# Patient Record
Sex: FEMALE | Race: WHITE | NOT HISPANIC OR LATINO | Employment: FULL TIME | URBAN - METROPOLITAN AREA
[De-identification: names, ages, dates, MRNs, and addresses within clinical notes are randomized per-mention and may not be internally consistent; named-entity substitution may affect disease eponyms.]

---

## 2017-01-26 ENCOUNTER — OFFICE VISIT - HEALTHEAST (OUTPATIENT)
Dept: FAMILY MEDICINE | Facility: CLINIC | Age: 32
End: 2017-01-26

## 2017-01-26 DIAGNOSIS — J06.9 ACUTE URI: ICD-10-CM

## 2017-01-26 DIAGNOSIS — N92.6 MISSED PERIOD: ICD-10-CM

## 2017-01-26 ASSESSMENT — MIFFLIN-ST. JEOR: SCORE: 1389.42

## 2017-02-03 ENCOUNTER — COMMUNICATION - HEALTHEAST (OUTPATIENT)
Dept: FAMILY MEDICINE | Facility: CLINIC | Age: 32
End: 2017-02-03

## 2017-02-03 DIAGNOSIS — J01.90 ACUTE SINUSITIS: ICD-10-CM

## 2017-02-13 ENCOUNTER — OFFICE VISIT - HEALTHEAST (OUTPATIENT)
Dept: FAMILY MEDICINE | Facility: CLINIC | Age: 32
End: 2017-02-13

## 2017-02-13 DIAGNOSIS — Z36.87 UNSURE OF LMP (LAST MENSTRUAL PERIOD) AS REASON FOR ULTRASOUND SCAN: ICD-10-CM

## 2017-02-13 DIAGNOSIS — N91.2 AMENORRHEA: ICD-10-CM

## 2017-02-13 DIAGNOSIS — F34.1 DYSTHYMIC DISORDER: ICD-10-CM

## 2017-02-13 DIAGNOSIS — F98.8 ATTENTION DEFICIT DISORDER: ICD-10-CM

## 2017-02-13 DIAGNOSIS — Z3A.01 LESS THAN 8 WEEKS GESTATION OF PREGNANCY: ICD-10-CM

## 2017-02-13 DIAGNOSIS — K21.9 GASTROESOPHAGEAL REFLUX DISEASE WITHOUT ESOPHAGITIS: ICD-10-CM

## 2017-02-13 ASSESSMENT — MIFFLIN-ST. JEOR: SCORE: 1409.83

## 2017-02-15 ENCOUNTER — COMMUNICATION - HEALTHEAST (OUTPATIENT)
Dept: FAMILY MEDICINE | Facility: CLINIC | Age: 32
End: 2017-02-15

## 2017-02-19 ENCOUNTER — COMMUNICATION - HEALTHEAST (OUTPATIENT)
Dept: FAMILY MEDICINE | Facility: CLINIC | Age: 32
End: 2017-02-19

## 2017-02-22 ENCOUNTER — HOSPITAL ENCOUNTER (OUTPATIENT)
Dept: ULTRASOUND IMAGING | Facility: HOSPITAL | Age: 32
Discharge: HOME | End: 2017-02-22
Attending: FAMILY MEDICINE

## 2017-02-22 ENCOUNTER — OFFICE VISIT - HEALTHEAST (OUTPATIENT)
Dept: FAMILY MEDICINE | Facility: CLINIC | Age: 32
End: 2017-02-22

## 2017-02-22 ENCOUNTER — SURGERY - HEALTHEAST (OUTPATIENT)
Dept: SURGERY | Facility: HOSPITAL | Age: 32
End: 2017-02-22

## 2017-02-22 ENCOUNTER — COMMUNICATION - HEALTHEAST (OUTPATIENT)
Dept: FAMILY MEDICINE | Facility: CLINIC | Age: 32
End: 2017-02-22

## 2017-02-22 ENCOUNTER — COMMUNICATION - HEALTHEAST (OUTPATIENT)
Dept: SCHEDULING | Facility: CLINIC | Age: 32
End: 2017-02-22

## 2017-02-22 ENCOUNTER — ANESTHESIA - HEALTHEAST (OUTPATIENT)
Dept: SURGERY | Facility: HOSPITAL | Age: 32
End: 2017-02-22

## 2017-02-22 DIAGNOSIS — O26.899 ABDOMINAL PAIN AFFECTING PREGNANCY: ICD-10-CM

## 2017-02-22 DIAGNOSIS — Z34.90 PREGNANCY: ICD-10-CM

## 2017-02-22 DIAGNOSIS — R10.9 ABDOMINAL PAIN AFFECTING PREGNANCY: ICD-10-CM

## 2017-02-22 DIAGNOSIS — O00.00 ABDOMINAL PREGNANCY WITHOUT INTRAUTERINE PREGNANCY: ICD-10-CM

## 2017-02-22 DIAGNOSIS — Z33.1 PREGNANT STATE, INCIDENTAL: ICD-10-CM

## 2017-02-22 ASSESSMENT — MIFFLIN-ST. JEOR: SCORE: 1416.64

## 2017-06-29 ENCOUNTER — OFFICE VISIT - HEALTHEAST (OUTPATIENT)
Dept: FAMILY MEDICINE | Facility: CLINIC | Age: 32
End: 2017-06-29

## 2017-06-29 DIAGNOSIS — M79.601 PARESTHESIA AND PAIN OF BOTH UPPER EXTREMITIES: ICD-10-CM

## 2017-06-29 DIAGNOSIS — R20.2 PARESTHESIA AND PAIN OF BOTH UPPER EXTREMITIES: ICD-10-CM

## 2017-06-29 DIAGNOSIS — M79.602 PARESTHESIA AND PAIN OF BOTH UPPER EXTREMITIES: ICD-10-CM

## 2017-06-29 LAB — HBA1C MFR BLD: 5.5 % (ref 3.5–6)

## 2017-06-29 ASSESSMENT — MIFFLIN-ST. JEOR: SCORE: 1471.07

## 2017-06-30 LAB — SYPHILIS RPR SCREEN - HISTORICAL: NORMAL

## 2017-07-13 ENCOUNTER — HOSPITAL ENCOUNTER (OUTPATIENT)
Dept: PHYSICAL MEDICINE AND REHAB | Facility: CLINIC | Age: 32
Discharge: HOME | End: 2017-07-13
Attending: FAMILY MEDICINE

## 2017-07-13 DIAGNOSIS — R20.2 NUMBNESS AND TINGLING IN BOTH HANDS: ICD-10-CM

## 2017-07-13 DIAGNOSIS — R20.0 NUMBNESS AND TINGLING IN BOTH HANDS: ICD-10-CM

## 2017-08-28 ENCOUNTER — COMMUNICATION - HEALTHEAST (OUTPATIENT)
Dept: FAMILY MEDICINE | Facility: CLINIC | Age: 32
End: 2017-08-28

## 2017-08-29 ENCOUNTER — COMMUNICATION - HEALTHEAST (OUTPATIENT)
Dept: FAMILY MEDICINE | Facility: CLINIC | Age: 32
End: 2017-08-29

## 2017-08-29 ENCOUNTER — RECORDS - HEALTHEAST (OUTPATIENT)
Dept: ADMINISTRATIVE | Facility: OTHER | Age: 32
End: 2017-08-29

## 2017-09-27 ENCOUNTER — OFFICE VISIT - HEALTHEAST (OUTPATIENT)
Dept: FAMILY MEDICINE | Facility: CLINIC | Age: 32
End: 2017-09-27

## 2017-09-27 DIAGNOSIS — M79.601 PARESTHESIA AND PAIN OF BOTH UPPER EXTREMITIES: ICD-10-CM

## 2017-09-27 DIAGNOSIS — M54.2 NECK PAIN: ICD-10-CM

## 2017-09-27 DIAGNOSIS — M79.602 PARESTHESIA AND PAIN OF BOTH UPPER EXTREMITIES: ICD-10-CM

## 2017-09-27 DIAGNOSIS — R20.2 PARESTHESIA AND PAIN OF BOTH UPPER EXTREMITIES: ICD-10-CM

## 2017-09-27 ASSESSMENT — MIFFLIN-ST. JEOR: SCORE: 1448.39

## 2017-10-13 ENCOUNTER — COMMUNICATION - HEALTHEAST (OUTPATIENT)
Dept: FAMILY MEDICINE | Facility: CLINIC | Age: 32
End: 2017-10-13

## 2017-10-24 ENCOUNTER — COMMUNICATION - HEALTHEAST (OUTPATIENT)
Dept: FAMILY MEDICINE | Facility: CLINIC | Age: 32
End: 2017-10-24

## 2017-12-18 ENCOUNTER — COMMUNICATION - HEALTHEAST (OUTPATIENT)
Dept: FAMILY MEDICINE | Facility: CLINIC | Age: 32
End: 2017-12-18

## 2018-01-16 ENCOUNTER — COMMUNICATION - HEALTHEAST (OUTPATIENT)
Dept: FAMILY MEDICINE | Facility: CLINIC | Age: 33
End: 2018-01-16

## 2018-01-18 ENCOUNTER — RECORDS - HEALTHEAST (OUTPATIENT)
Dept: ADMINISTRATIVE | Facility: OTHER | Age: 33
End: 2018-01-18

## 2018-01-18 LAB
HPV_EXT - HISTORICAL: NORMAL
PAP SMEAR - HIM PATIENT REPORTED: ABNORMAL

## 2018-01-29 ENCOUNTER — COMMUNICATION - HEALTHEAST (OUTPATIENT)
Dept: FAMILY MEDICINE | Facility: CLINIC | Age: 33
End: 2018-01-29

## 2018-02-02 ENCOUNTER — RECORDS - HEALTHEAST (OUTPATIENT)
Dept: ADMINISTRATIVE | Facility: OTHER | Age: 33
End: 2018-02-02

## 2018-09-25 ENCOUNTER — AMBULATORY - HEALTHEAST (OUTPATIENT)
Dept: NURSING | Facility: CLINIC | Age: 33
End: 2018-09-25

## 2018-09-25 DIAGNOSIS — Z23 NEEDS FLU SHOT: ICD-10-CM

## 2018-10-12 ENCOUNTER — COMMUNICATION - HEALTHEAST (OUTPATIENT)
Dept: CARE COORDINATION | Facility: CLINIC | Age: 33
End: 2018-10-12

## 2018-10-15 ASSESSMENT — MIFFLIN-ST. JEOR: SCORE: 1548.18

## 2018-10-16 ENCOUNTER — ANESTHESIA - HEALTHEAST (OUTPATIENT)
Dept: OBGYN | Facility: HOSPITAL | Age: 33
End: 2018-10-16

## 2018-10-16 ENCOUNTER — SURGERY - HEALTHEAST (OUTPATIENT)
Dept: OBGYN | Facility: HOSPITAL | Age: 33
End: 2018-10-16

## 2018-10-16 ASSESSMENT — MIFFLIN-ST. JEOR: SCORE: 1548.18

## 2018-10-17 ENCOUNTER — HOME CARE/HOSPICE - HEALTHEAST (OUTPATIENT)
Dept: HOME HEALTH SERVICES | Facility: HOME HEALTH | Age: 33
End: 2018-10-17

## 2018-10-19 ENCOUNTER — HOME CARE/HOSPICE - HEALTHEAST (OUTPATIENT)
Dept: HOME HEALTH SERVICES | Facility: HOME HEALTH | Age: 33
End: 2018-10-19

## 2018-10-19 ENCOUNTER — COMMUNICATION - HEALTHEAST (OUTPATIENT)
Dept: CARE COORDINATION | Facility: CLINIC | Age: 33
End: 2018-10-19

## 2018-10-24 ENCOUNTER — COMMUNICATION - HEALTHEAST (OUTPATIENT)
Dept: OBGYN | Facility: CLINIC | Age: 33
End: 2018-10-24

## 2019-01-31 ENCOUNTER — COMMUNICATION - HEALTHEAST (OUTPATIENT)
Dept: FAMILY MEDICINE | Facility: CLINIC | Age: 34
End: 2019-01-31

## 2019-03-14 ENCOUNTER — OFFICE VISIT - HEALTHEAST (OUTPATIENT)
Dept: FAMILY MEDICINE | Facility: CLINIC | Age: 34
End: 2019-03-14

## 2019-03-14 DIAGNOSIS — B34.9 VIRAL ILLNESS: ICD-10-CM

## 2019-03-14 DIAGNOSIS — J02.9 SORE THROAT: ICD-10-CM

## 2019-03-14 LAB
DEPRECATED S PYO AG THROAT QL EIA: NORMAL
FLUAV AG SPEC QL IA: NORMAL
FLUBV AG SPEC QL IA: NORMAL

## 2019-03-14 ASSESSMENT — MIFFLIN-ST. JEOR: SCORE: 1430.24

## 2019-03-15 LAB — GROUP A STREP BY PCR: NORMAL

## 2019-04-22 ENCOUNTER — OFFICE VISIT - HEALTHEAST (OUTPATIENT)
Dept: FAMILY MEDICINE | Facility: CLINIC | Age: 34
End: 2019-04-22

## 2019-04-22 DIAGNOSIS — F41.9 ANXIETY: ICD-10-CM

## 2019-04-22 DIAGNOSIS — F33.0 MILD EPISODE OF RECURRENT MAJOR DEPRESSIVE DISORDER (H): ICD-10-CM

## 2019-04-22 DIAGNOSIS — F90.0 ATTENTION DEFICIT HYPERACTIVITY DISORDER (ADHD), PREDOMINANTLY INATTENTIVE TYPE: ICD-10-CM

## 2019-04-22 ASSESSMENT — MIFFLIN-ST. JEOR: SCORE: 1439.32

## 2019-04-26 ENCOUNTER — COMMUNICATION - HEALTHEAST (OUTPATIENT)
Dept: FAMILY MEDICINE | Facility: CLINIC | Age: 34
End: 2019-04-26

## 2019-05-28 ENCOUNTER — OFFICE VISIT - HEALTHEAST (OUTPATIENT)
Dept: FAMILY MEDICINE | Facility: CLINIC | Age: 34
End: 2019-05-28

## 2019-05-28 DIAGNOSIS — R19.7 DIARRHEA OF PRESUMED INFECTIOUS ORIGIN: ICD-10-CM

## 2019-05-28 LAB
C DIFF TOX B STL QL: NEGATIVE
RIBOTYPE 027/NAP1/BI: NORMAL

## 2019-05-28 ASSESSMENT — MIFFLIN-ST. JEOR: SCORE: 1427.98

## 2019-05-29 LAB
C PARVUM AG STL QL IA: NORMAL
G LAMBLIA AG STL QL IA: NORMAL
O+P STL MICRO: NORMAL
SHIGA TOXIN 1: NEGATIVE
SHIGA TOXIN 2: NEGATIVE

## 2019-05-31 LAB — BACTERIA SPEC CULT: NORMAL

## 2019-06-19 ENCOUNTER — COMMUNICATION - HEALTHEAST (OUTPATIENT)
Dept: FAMILY MEDICINE | Facility: CLINIC | Age: 34
End: 2019-06-19

## 2019-06-19 DIAGNOSIS — F90.0 ATTENTION DEFICIT HYPERACTIVITY DISORDER (ADHD), PREDOMINANTLY INATTENTIVE TYPE: ICD-10-CM

## 2019-07-09 ENCOUNTER — OFFICE VISIT - HEALTHEAST (OUTPATIENT)
Dept: FAMILY MEDICINE | Facility: CLINIC | Age: 34
End: 2019-07-09

## 2019-07-09 ENCOUNTER — COMMUNICATION - HEALTHEAST (OUTPATIENT)
Dept: FAMILY MEDICINE | Facility: CLINIC | Age: 34
End: 2019-07-09

## 2019-07-09 DIAGNOSIS — N76.0 ACUTE VAGINITIS: ICD-10-CM

## 2019-07-09 LAB
CLUE CELLS: ABNORMAL
TRICHOMONAS, WET PREP: ABNORMAL
YEAST, WET PREP: ABNORMAL

## 2019-07-09 ASSESSMENT — MIFFLIN-ST. JEOR: SCORE: 1439.32

## 2019-07-16 ENCOUNTER — RECORDS - HEALTHEAST (OUTPATIENT)
Dept: HEALTH INFORMATION MANAGEMENT | Facility: CLINIC | Age: 34
End: 2019-07-16

## 2019-07-17 ENCOUNTER — COMMUNICATION - HEALTHEAST (OUTPATIENT)
Dept: FAMILY MEDICINE | Facility: CLINIC | Age: 34
End: 2019-07-17

## 2019-07-17 DIAGNOSIS — L71.0 PERIORAL DERMATITIS: ICD-10-CM

## 2019-07-17 DIAGNOSIS — N76.0 ACUTE VAGINITIS: ICD-10-CM

## 2019-07-17 DIAGNOSIS — F90.0 ATTENTION DEFICIT HYPERACTIVITY DISORDER (ADHD), PREDOMINANTLY INATTENTIVE TYPE: ICD-10-CM

## 2019-08-15 ENCOUNTER — COMMUNICATION - HEALTHEAST (OUTPATIENT)
Dept: FAMILY MEDICINE | Facility: CLINIC | Age: 34
End: 2019-08-15

## 2019-08-15 DIAGNOSIS — F90.0 ATTENTION DEFICIT HYPERACTIVITY DISORDER (ADHD), PREDOMINANTLY INATTENTIVE TYPE: ICD-10-CM

## 2019-09-25 ENCOUNTER — COMMUNICATION - HEALTHEAST (OUTPATIENT)
Dept: FAMILY MEDICINE | Facility: CLINIC | Age: 34
End: 2019-09-25

## 2019-09-25 DIAGNOSIS — F90.0 ATTENTION DEFICIT HYPERACTIVITY DISORDER (ADHD), PREDOMINANTLY INATTENTIVE TYPE: ICD-10-CM

## 2019-10-28 ENCOUNTER — COMMUNICATION - HEALTHEAST (OUTPATIENT)
Dept: FAMILY MEDICINE | Facility: CLINIC | Age: 34
End: 2019-10-28

## 2019-10-28 DIAGNOSIS — F90.0 ATTENTION DEFICIT HYPERACTIVITY DISORDER (ADHD), PREDOMINANTLY INATTENTIVE TYPE: ICD-10-CM

## 2019-12-03 ENCOUNTER — COMMUNICATION - HEALTHEAST (OUTPATIENT)
Dept: FAMILY MEDICINE | Facility: CLINIC | Age: 34
End: 2019-12-03

## 2019-12-03 DIAGNOSIS — F90.0 ATTENTION DEFICIT HYPERACTIVITY DISORDER (ADHD), PREDOMINANTLY INATTENTIVE TYPE: ICD-10-CM

## 2020-01-01 ENCOUNTER — COMMUNICATION - HEALTHEAST (OUTPATIENT)
Dept: FAMILY MEDICINE | Facility: CLINIC | Age: 35
End: 2020-01-01

## 2020-01-01 DIAGNOSIS — F33.0 MILD EPISODE OF RECURRENT MAJOR DEPRESSIVE DISORDER (H): ICD-10-CM

## 2020-01-01 DIAGNOSIS — F90.0 ATTENTION DEFICIT HYPERACTIVITY DISORDER (ADHD), PREDOMINANTLY INATTENTIVE TYPE: ICD-10-CM

## 2020-01-01 DIAGNOSIS — F41.9 ANXIETY: ICD-10-CM

## 2020-01-03 ENCOUNTER — COMMUNICATION - HEALTHEAST (OUTPATIENT)
Dept: FAMILY MEDICINE | Facility: CLINIC | Age: 35
End: 2020-01-03

## 2020-01-03 DIAGNOSIS — F33.0 MILD EPISODE OF RECURRENT MAJOR DEPRESSIVE DISORDER (H): ICD-10-CM

## 2020-02-04 ENCOUNTER — COMMUNICATION - HEALTHEAST (OUTPATIENT)
Dept: FAMILY MEDICINE | Facility: CLINIC | Age: 35
End: 2020-02-04

## 2020-02-04 DIAGNOSIS — F90.0 ATTENTION DEFICIT HYPERACTIVITY DISORDER (ADHD), PREDOMINANTLY INATTENTIVE TYPE: ICD-10-CM

## 2020-03-02 ENCOUNTER — COMMUNICATION - HEALTHEAST (OUTPATIENT)
Dept: FAMILY MEDICINE | Facility: CLINIC | Age: 35
End: 2020-03-02

## 2020-03-02 DIAGNOSIS — F90.0 ATTENTION DEFICIT HYPERACTIVITY DISORDER (ADHD), PREDOMINANTLY INATTENTIVE TYPE: ICD-10-CM

## 2020-03-26 ENCOUNTER — OFFICE VISIT - HEALTHEAST (OUTPATIENT)
Dept: FAMILY MEDICINE | Facility: CLINIC | Age: 35
End: 2020-03-26

## 2020-03-26 DIAGNOSIS — R05.9 COUGH: ICD-10-CM

## 2020-03-30 ENCOUNTER — COMMUNICATION - HEALTHEAST (OUTPATIENT)
Dept: FAMILY MEDICINE | Facility: CLINIC | Age: 35
End: 2020-03-30

## 2020-03-30 DIAGNOSIS — L71.0 PERIORAL DERMATITIS: ICD-10-CM

## 2020-04-02 ENCOUNTER — COMMUNICATION - HEALTHEAST (OUTPATIENT)
Dept: FAMILY MEDICINE | Facility: CLINIC | Age: 35
End: 2020-04-02

## 2020-04-02 DIAGNOSIS — F33.0 MILD EPISODE OF RECURRENT MAJOR DEPRESSIVE DISORDER (H): ICD-10-CM

## 2020-04-02 DIAGNOSIS — F90.0 ATTENTION DEFICIT HYPERACTIVITY DISORDER (ADHD), PREDOMINANTLY INATTENTIVE TYPE: ICD-10-CM

## 2020-04-14 ENCOUNTER — COMMUNICATION - HEALTHEAST (OUTPATIENT)
Dept: FAMILY MEDICINE | Facility: CLINIC | Age: 35
End: 2020-04-14

## 2020-04-15 ENCOUNTER — OFFICE VISIT - HEALTHEAST (OUTPATIENT)
Dept: FAMILY MEDICINE | Facility: CLINIC | Age: 35
End: 2020-04-15

## 2020-04-15 DIAGNOSIS — H92.02 OTALGIA, LEFT: ICD-10-CM

## 2020-04-15 ASSESSMENT — PATIENT HEALTH QUESTIONNAIRE - PHQ9: SUM OF ALL RESPONSES TO PHQ QUESTIONS 1-9: 0

## 2020-05-06 ENCOUNTER — COMMUNICATION - HEALTHEAST (OUTPATIENT)
Dept: FAMILY MEDICINE | Facility: CLINIC | Age: 35
End: 2020-05-06

## 2020-05-06 DIAGNOSIS — F90.0 ATTENTION DEFICIT HYPERACTIVITY DISORDER (ADHD), PREDOMINANTLY INATTENTIVE TYPE: ICD-10-CM

## 2020-06-02 ENCOUNTER — COMMUNICATION - HEALTHEAST (OUTPATIENT)
Dept: FAMILY MEDICINE | Facility: CLINIC | Age: 35
End: 2020-06-02

## 2020-06-02 DIAGNOSIS — F90.0 ATTENTION DEFICIT HYPERACTIVITY DISORDER (ADHD), PREDOMINANTLY INATTENTIVE TYPE: ICD-10-CM

## 2020-06-02 DIAGNOSIS — F33.0 MILD EPISODE OF RECURRENT MAJOR DEPRESSIVE DISORDER (H): ICD-10-CM

## 2020-07-03 ENCOUNTER — COMMUNICATION - HEALTHEAST (OUTPATIENT)
Dept: FAMILY MEDICINE | Facility: CLINIC | Age: 35
End: 2020-07-03

## 2020-07-03 DIAGNOSIS — F90.0 ATTENTION DEFICIT HYPERACTIVITY DISORDER (ADHD), PREDOMINANTLY INATTENTIVE TYPE: ICD-10-CM

## 2020-08-03 ENCOUNTER — COMMUNICATION - HEALTHEAST (OUTPATIENT)
Dept: FAMILY MEDICINE | Facility: CLINIC | Age: 35
End: 2020-08-03

## 2020-08-03 DIAGNOSIS — F90.0 ATTENTION DEFICIT HYPERACTIVITY DISORDER (ADHD), PREDOMINANTLY INATTENTIVE TYPE: ICD-10-CM

## 2020-09-03 ENCOUNTER — COMMUNICATION - HEALTHEAST (OUTPATIENT)
Dept: FAMILY MEDICINE | Facility: CLINIC | Age: 35
End: 2020-09-03

## 2020-09-03 DIAGNOSIS — F90.0 ATTENTION DEFICIT HYPERACTIVITY DISORDER (ADHD), PREDOMINANTLY INATTENTIVE TYPE: ICD-10-CM

## 2020-09-09 ENCOUNTER — OFFICE VISIT - HEALTHEAST (OUTPATIENT)
Dept: FAMILY MEDICINE | Facility: CLINIC | Age: 35
End: 2020-09-09

## 2020-09-09 ENCOUNTER — COMMUNICATION - HEALTHEAST (OUTPATIENT)
Dept: FAMILY MEDICINE | Facility: CLINIC | Age: 35
End: 2020-09-09

## 2020-09-09 DIAGNOSIS — L30.9 ECZEMA, UNSPECIFIED TYPE: ICD-10-CM

## 2020-09-09 DIAGNOSIS — H04.302 DACROCYSTITIS, LEFT: ICD-10-CM

## 2020-10-05 ENCOUNTER — COMMUNICATION - HEALTHEAST (OUTPATIENT)
Dept: FAMILY MEDICINE | Facility: CLINIC | Age: 35
End: 2020-10-05

## 2020-10-05 DIAGNOSIS — F90.0 ATTENTION DEFICIT HYPERACTIVITY DISORDER (ADHD), PREDOMINANTLY INATTENTIVE TYPE: ICD-10-CM

## 2020-10-22 ENCOUNTER — OFFICE VISIT - HEALTHEAST (OUTPATIENT)
Dept: FAMILY MEDICINE | Facility: CLINIC | Age: 35
End: 2020-10-22

## 2020-10-22 ENCOUNTER — COMMUNICATION - HEALTHEAST (OUTPATIENT)
Dept: FAMILY MEDICINE | Facility: CLINIC | Age: 35
End: 2020-10-22

## 2020-10-22 DIAGNOSIS — M25.511 ACUTE PAIN OF RIGHT SHOULDER: ICD-10-CM

## 2020-10-22 DIAGNOSIS — M77.11 RIGHT LATERAL EPICONDYLITIS: ICD-10-CM

## 2020-10-22 DIAGNOSIS — Z32.01 PREGNANCY CONFIRMED BY POSITIVE URINE TEST: ICD-10-CM

## 2020-10-22 LAB — HCG UR QL: POSITIVE

## 2020-10-22 RX ORDER — LORATADINE 10 MG/1
10 TABLET ORAL DAILY
Status: SHIPPED | COMMUNITY
Start: 2020-10-22 | End: 2022-04-20

## 2020-10-22 ASSESSMENT — MIFFLIN-ST. JEOR: SCORE: 1493.75

## 2020-10-27 ENCOUNTER — COMMUNICATION - HEALTHEAST (OUTPATIENT)
Dept: FAMILY MEDICINE | Facility: CLINIC | Age: 35
End: 2020-10-27

## 2020-10-27 DIAGNOSIS — F33.0 MILD EPISODE OF RECURRENT MAJOR DEPRESSIVE DISORDER (H): ICD-10-CM

## 2020-11-09 ENCOUNTER — COMMUNICATION - HEALTHEAST (OUTPATIENT)
Dept: FAMILY MEDICINE | Facility: CLINIC | Age: 35
End: 2020-11-09

## 2020-11-09 ENCOUNTER — VIRTUAL VISIT (OUTPATIENT)
Dept: FAMILY MEDICINE | Facility: OTHER | Age: 35
End: 2020-11-09
Payer: COMMERCIAL

## 2020-11-09 PROCEDURE — 99421 OL DIG E/M SVC 5-10 MIN: CPT | Performed by: FAMILY MEDICINE

## 2020-11-10 NOTE — PROGRESS NOTES
"Date: 2020 14:35:23  Clinician: Marcus Encarnacion  Clinician NPI: [id number]  Patient: Lorraine Harris  Patient : 1985  Patient Address: [address] [place], [place] [address] [id number]  Patient Phone: [phone]  Visit Protocol: URI  Patient Summary:  Lorraine is a 35 year old ( : 1985 ) female who initiated a [facility] Visit for COVID-19 (Coronavirus) evaluation and screening. When asked the question \"Please sign me up to receive news, health information and promotions from [facility].\", Lorraine responded \"No\".    Lorraine states her symptoms started suddenly 3-4 days ago.   Her symptoms consist of rhinitis, facial pain or pressure, myalgia, chills, malaise, a sore throat, diarrhea, a headache, a cough, nasal congestion, and nausea. She is experiencing mild difficulty breathing with activities but can speak normally in full sentences. Lorraine also feels feverish.   Symptom details     Nasal secretions: The color of her mucus is green.    Cough: Lorraine coughs a few times an hour and her cough is more bothersome at night. Phlegm does not come into her throat when she coughs. She does not believe her cough is caused by post-nasal drip.     Sore throat: Lorraine reports having mild throat pain (1-3 on a 10 point pain scale), does not have exudate on her tonsils, and can swallow liquids. She is not sure if the lymph nodes in her neck are enlarged. A rash has not appeared on the skin since the sore throat started.     Temperature: Her current temperature is 97.9 degrees Fahrenheit.     Facial pain or pressure: The facial pain or pressure feels worse when bending over or leaning forward.     Headache: She states the headache is moderate (4-6 on a 10 point pain scale).      Lorraine denies having vomiting, teeth pain, ageusia, ear pain, wheezing, and anosmia. She also denies taking antibiotic medication in the past month, having recent facial or sinus surgery in the past 60 days, and double sickening (worsening symptoms after initial " improvement).   Precipitating events  Within the past week, Lorraine has not been exposed to someone with strep throat. She has not recently been exposed to someone with influenza. Lorraine has been in close contact with the following high risk individuals: immunocompromised people, adults 65 or older, pregnant women, children under the age of 5, and people with asthma, heart disease or diabetes.   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 In the past 14 days, Lorraine has not worked or volunteered at a healthcare facility or group living setting.   In the past 14 days, she also has not lived in a congregate living setting.   Lorraine has not had a close contact with a laboratory-confirmed COVID-19 patient within 14 days of symptom onset.    Since December 2019, Lorraine has been tested for COVID-19 and has not had upper respiratory infection or influenza-like illness.      Result of COVID-19 test: Negative     Date of her COVID-19 test: 04/01/2020      Pertinent medical history  Lorraine had 1 sinus infection within the past year.   Lorraine does not get yeast infections when she takes antibiotics.   Lorraine needs a return to work/school note.   Weight: 180 lbs   Lorraine does not smoke or use smokeless tobacco.   She is pregnant and denies breastfeeding.   Additional information as reported by the patient (free text): About 6 weeks pregnant. First felt like sinus infection but last night started feeling awful-full body aches, coughing, headache, chills, waking up in sweats or freezing, nasal congestion, green mucus, eyes/nose burning.   Weight: 180 lbs    MEDICATIONS: Tums oral, Flonase Allergy Relief nasal, Prenatal oral, Tylenol oral, venlafaxine oral, ALLERGIES: NKDA  Clinician Response:  Dear Lorraine,   Your symptoms show that you may have coronavirus (COVID-19). This illness can cause fever, cough and trouble breathing. Many  "people get a mild case and get better on their own. Some people can get very sick.  What should I do?  We would like to test you for this virus.   1. Please call [phone] to schedule your visit. Explain that you were referred by [facility] to have a COVID-19 test. Be ready to share your [facility] visit ID number.  * If you need to schedule in [facility] please call [phone] or for [employer] [place] employees please call [phone].  * If you need to schedule in the [place] area please call [phone]. [employer] employees call [phone].  The following will serve as your written order for this COVID Test, ordered by me, for the indication of suspected COVID [Z20.828]: The test will be ordered in [employer], our electronic health record, after you are scheduled. It will show as ordered and authorized by Arash Rhodes MD.  Order: COVID-19 (Coronavirus) PCR for SYMPTOMATIC testing from [facility].   2. When it's time for your COVID test:  Stay at least 6 feet away from others. (If someone will drive you to your test, stay in the backseat, as far away from the  as you can.)   Cover your mouth and nose with a mask, tissue or washcloth.  Go straight to the testing site. Don't make any stops on the way there or back.      3.Starting now: Stay home and away from others (self-isolate) until:   You've had no fever---and no medicine that reduces fever---for one full day (24 hours). And...   Your other symptoms have gotten better. For example, your cough or breathing has improved. And...   At least 10 days have passed since your symptoms started.       During this time, don't leave the house except for testing or medical care.   Stay in your own room, even for meals. Use your own bathroom if you can.   Stay away from others in your home. No hugging, kissing or shaking hands. No visitors.  Don't go to work, school or anywhere else.    Clean \"high touch\" surfaces often (doorknobs, counters, handles, etc.). Use a household cleaning spray " or wipes. You'll find a full list of  on the EPA website: www.epa.gov/pesticide-registration/list-n-disinfectants-use-against-sars-cov-2.   Cover your mouth and nose with a mask, tissue or washcloth to avoid spreading germs.  Wash your hands and face often. Use soap and water.  Caregivers in these groups are at risk for severe illness due to COVID-19:  o People 65 years and older  o People who live in a nursing home or long-term care facility  o People with chronic disease (lung, heart, cancer, diabetes, kidney, liver, immunologic)  o People who have a weakened immune system, including those who:   Are in cancer treatment  Take medicine that weakens the immune system, such as corticosteroids  Had a bone marrow or organ transplant  Have an immune deficiency  Have poorly controlled HIV or AIDS  Are obese (body mass index of 40 or higher)  Smoke regularly   o Caregivers should wear gloves while washing dishes, handling laundry and cleaning bedrooms and bathrooms.  o Use caution when washing and drying laundry: Don't shake dirty laundry, and use the warmest water setting that you can.  o For more tips, go to www.cdc.gov/coronavirus/2019-ncov/downloads/10Things.pdf.    4.Sign up for [employer]. We know it's scary to hear that you might have COVID-19. We want to track your symptoms to make sure you're okay over the next 2 weeks. Please look for an email from [employer]---this is a free, online program that we'll use to keep in touch. To sign up, follow the link in the email. Learn more at [email or web address]  How can I take care of myself?   Get lots of rest. Drink extra fluids (unless a doctor has told you not to).   Take Tylenol (acetaminophen) for fever or pain. If you have liver or kidney problems, ask your family doctor if it's okay to take Tylenol.   Adults can take either:    650 mg (two 325 mg pills) every 4 to 6 hours, or...   1,000 mg (two 500 mg pills) every 8 hours as needed.    Note:  Don't take more than 3,000 mg in one day. Acetaminophen is found in many medicines (both prescribed and over-the-counter medicines). Read all labels to be sure you don't take too much.   For children, check the Tylenol bottle for the right dose. The dose is based on the child's age or weight.    If you have other health problems (like cancer, heart failure, an organ transplant or severe kidney disease): Call your specialty clinic if you don't feel better in the next 2 days.       Know when to call 911. Emergency warning signs include:    Trouble breathing or shortness of breath Pain or pressure in the chest that doesn't go away Feeling confused like you haven't felt before, or not being able to wake up Bluish-colored lips or face.  Where can I get more information?   [facility] -- About COVID-19: [email or web address]   CDC -- What to Do If You're Sick: www.cdc.gov/coronavirus/2019-ncov/about/steps-when-sick.html   CDC -- Ending Home Isolation: www.cdc.gov/coronavirus/2019-ncov/hcp/disposition-in-home-patients.html   CDC -- Caring for Someone: www.cdc.gov/coronavirus/2019-ncov/if-you-are-sick/care-for-someone.html   [facility] -- Interim Guidance for Hospital Discharge to Home: www.health.[facility].mn.us/diseases/coronavirus/hcp/hospdischarge.pdf   [facility] clinical trials (COVID-19 research studies): clinicalaffairs.[facility]/n-clinical-trials    Below are the COVID-19 hotlines at the [facility] ([facility]). Interpreters are available.    For health questions: Call [phone] or 1-157.151.2803 (7 a.m. to 7 p.m.) For questions about schools and childcare: Call [phone] or 1-111.206.2673 (7 a.m. to 7 p.m.)    Diagnosis: Contact with and (suspected) exposure to other viral communicable diseases  Diagnosis ICD: Z20.828

## 2020-11-11 ENCOUNTER — RECORDS - HEALTHEAST (OUTPATIENT)
Dept: LAB | Facility: CLINIC | Age: 35
End: 2020-11-11

## 2020-11-11 LAB
SARS-COV-2 PCR COMMENT: NORMAL
SARS-COV-2 RNA SPEC QL NAA+PROBE: NEGATIVE
SARS-COV-2 VIRUS SPECIMEN SOURCE: NORMAL

## 2020-11-30 ENCOUNTER — COMMUNICATION - HEALTHEAST (OUTPATIENT)
Dept: FAMILY MEDICINE | Facility: CLINIC | Age: 35
End: 2020-11-30

## 2020-11-30 DIAGNOSIS — F33.0 MILD EPISODE OF RECURRENT MAJOR DEPRESSIVE DISORDER (H): ICD-10-CM

## 2020-12-28 ENCOUNTER — AMBULATORY - HEALTHEAST (OUTPATIENT)
Dept: OBGYN | Facility: CLINIC | Age: 35
End: 2020-12-28

## 2020-12-28 DIAGNOSIS — Z11.59 ENCOUNTER FOR SCREENING FOR OTHER VIRAL DISEASES: ICD-10-CM

## 2020-12-29 ENCOUNTER — RECORDS - HEALTHEAST (OUTPATIENT)
Dept: ADMINISTRATIVE | Facility: OTHER | Age: 35
End: 2020-12-29

## 2021-03-19 ENCOUNTER — RECORDS - HEALTHEAST (OUTPATIENT)
Dept: ADMINISTRATIVE | Facility: OTHER | Age: 36
End: 2021-03-19

## 2021-03-22 ENCOUNTER — HOSPITAL ENCOUNTER (OUTPATIENT)
Dept: CARDIOLOGY | Facility: HOSPITAL | Age: 36
Discharge: HOME | End: 2021-03-22
Attending: OBSTETRICS & GYNECOLOGY

## 2021-03-22 DIAGNOSIS — R00.0 TACHYCARDIA: ICD-10-CM

## 2021-04-28 ENCOUNTER — COMMUNICATION - HEALTHEAST (OUTPATIENT)
Dept: FAMILY MEDICINE | Facility: CLINIC | Age: 36
End: 2021-04-28

## 2021-04-28 ENCOUNTER — OFFICE VISIT - HEALTHEAST (OUTPATIENT)
Dept: FAMILY MEDICINE | Facility: CLINIC | Age: 36
End: 2021-04-28

## 2021-04-28 DIAGNOSIS — U07.1 INFECTION DUE TO 2019 NOVEL CORONAVIRUS: ICD-10-CM

## 2021-04-28 DIAGNOSIS — R06.00 DYSPNEA, UNSPECIFIED TYPE: ICD-10-CM

## 2021-04-28 RX ORDER — ALBUTEROL SULFATE 0.83 MG/ML
SOLUTION RESPIRATORY (INHALATION)
Status: SHIPPED | COMMUNITY
Start: 2021-04-20 | End: 2022-04-20

## 2021-04-28 ASSESSMENT — PATIENT HEALTH QUESTIONNAIRE - PHQ9: SUM OF ALL RESPONSES TO PHQ QUESTIONS 1-9: 0

## 2021-04-29 ENCOUNTER — COMMUNICATION - HEALTHEAST (OUTPATIENT)
Dept: FAMILY MEDICINE | Facility: CLINIC | Age: 36
End: 2021-04-29

## 2021-04-30 ENCOUNTER — HOSPITAL ENCOUNTER (OUTPATIENT)
Dept: CT IMAGING | Facility: HOSPITAL | Age: 36
Discharge: HOME | End: 2021-04-30
Attending: FAMILY MEDICINE

## 2021-04-30 ENCOUNTER — OFFICE VISIT - HEALTHEAST (OUTPATIENT)
Dept: FAMILY MEDICINE | Facility: CLINIC | Age: 36
End: 2021-04-30

## 2021-04-30 DIAGNOSIS — R06.02 SHORTNESS OF BREATH: ICD-10-CM

## 2021-04-30 DIAGNOSIS — R05.9 COUGH: ICD-10-CM

## 2021-04-30 DIAGNOSIS — U07.1 2019 NOVEL CORONAVIRUS DISEASE (COVID-19): ICD-10-CM

## 2021-04-30 RX ORDER — SERTRALINE HYDROCHLORIDE 25 MG/1
TABLET, FILM COATED ORAL
Status: SHIPPED | COMMUNITY
Start: 2021-04-29 | End: 2021-09-09

## 2021-04-30 ASSESSMENT — MIFFLIN-ST. JEOR: SCORE: 1557.82

## 2021-05-03 ENCOUNTER — AMBULATORY - HEALTHEAST (OUTPATIENT)
Dept: FAMILY MEDICINE | Facility: CLINIC | Age: 36
End: 2021-05-03

## 2021-05-03 DIAGNOSIS — R06.02 SHORTNESS OF BREATH: ICD-10-CM

## 2021-05-03 DIAGNOSIS — U07.1 2019 NOVEL CORONAVIRUS DISEASE (COVID-19): ICD-10-CM

## 2021-05-03 DIAGNOSIS — R05.9 COUGH: ICD-10-CM

## 2021-05-03 RX ORDER — HYDROXYZINE PAMOATE 50 MG/1
CAPSULE ORAL
Qty: 90 CAPSULE | Refills: 1 | Status: SHIPPED | OUTPATIENT
Start: 2021-05-03 | End: 2022-04-20

## 2021-05-28 NOTE — PROGRESS NOTES
Assessment/ Plan     1. Attention deficit hyperactivity disorder (ADHD), predominantly inattentive type  Patient recently restarted her all after being off of it for pregnancy and breast-feeding.  She feels like the 30 mg dose was too strong including jitteriness and insomnia.  We will have her decrease the dose to 15 mg and she can let me know how things are going over the next couple of months.  - dextroamphetamine-amphetamine (ADDERALL XR) 15 MG 24 hr capsule; Take 1 capsule (15 mg total) by mouth daily.  Dispense: 30 capsule; Refill: 0    2. Mild episode of recurrent major depressive disorder (H)  Patient feels like her depression is under pretty good control.  - venlafaxine 37.5 mg TR24; Take 37.5 mg by mouth daily.  Dispense: 30 each; Refill: 2    3. Anxiety  She does feel like her anxiety and irritability have been elevated.  She was on duloxetine and venlafaxine via a psychiatrist prior to this pregnancy.  Would recommend restarting 1 of those as they are from the same class.  We will start with low-dose venlafaxine.  She can let me know in 4 to 6 weeks how things are going and we can increase the dose if needed at that time.  - venlafaxine 37.5 mg TR24; Take 37.5 mg by mouth daily.  Dispense: 30 each; Refill: 2      Subjective:       Lorraine Harris is a 34 y.o. female who presents for follow-up of ADHD and anxiety.  Patient recently restarted her Adderall after being off of it for pregnancy, delivery, and breast-feeding.  She restarted at the dose that she had left off on.  She feels like it is too strong for her now.  She has been feeling jittery and oftentimes she will have insomnia and have to take melatonin.  She is really only taking it when she has to work over the long shift.  She is wondering about maybe trying the immediate release.  We discussed other options including decreasing the dose and I think that would be a better option for her.  She was also on duloxetine and venlafaxine prior to getting  "pregnant.  This was through another physician.  She states she was initially on duloxetine and then venlafaxine was added.  She feels like she is having more anxiety and less depression this time around.  She feels like she is really anxious and irritable at times.  She was wondering about a short acting but after we talked about it, she is having daily symptoms.  She is willing to try going back on a medication daily.    Relevant past medical, family, surgical, and social history reviewed with patient, unless noted in HPI, not pertinent for this visit.  Medications were discussed and reconciled.   Review of Systems   A 12 point comprehensive review of systems was negative except as noted.      Current Outpatient Medications   Medication Sig Dispense Refill     multivitamin with minerals (HAIR,SKIN AND NAILS ORAL) Take by mouth.       prenatal vitamin iron-folic acid 27mg-0.8mg (PRENATAL S) 27 mg iron- 800 mcg Tab tablet Take 1 tablet by mouth daily.       dextroamphetamine-amphetamine (ADDERALL XR) 15 MG 24 hr capsule Take 1 capsule (15 mg total) by mouth daily. 30 capsule 0     venlafaxine 37.5 mg TR24 Take 37.5 mg by mouth daily. 30 each 2     No current facility-administered medications for this visit.        Objective:      /70   Pulse 100   Temp 98.3  F (36.8  C)   Resp 20   Ht 5' 3\" (1.6 m)   Wt 172 lb (78 kg)   LMP 04/15/2019   BMI 30.47 kg/m        General appearance: alert, appears stated age and cooperative  Good eye contact and social smile, speech is normal in fluency and content.  Lungs: clear to auscultation bilaterally  Heart: regular rate and rhythm, S1, S2 normal, no murmur, click, rub or gallop      No results found for this or any previous visit (from the past 168 hour(s)).       This note has been dictated using voice recognition software. Any grammatical or context distortions are unintentional and inherent to the software  "

## 2021-05-28 NOTE — TELEPHONE ENCOUNTER
Central PA team  [phone]  Pool: HE PA MED ([id number])          PA has been initiated.       PA form completed and faxed insurance via Cover My Meds     Key:  Your Tracking Number is [id number]     Medication:  ADDERALL XR 15MG    Insurance:  PREFERRED ONE        Response will be received via fax and may take up to 5-10 business days depending on plan

## 2021-05-28 NOTE — TELEPHONE ENCOUNTER
Spoke to pharmacy.  PA required for Adderall XR 15mg take 1 capsule by mouth daily.  Please start PA

## 2021-05-29 NOTE — PROGRESS NOTES
Assessment/ Plan     1. Diarrhea of presumed infectious origin  Patient is now had 6 days of frequent, watery diarrhea that does not seem to be letting up.  Concern would be for possible infectious etiology.  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  She can use some Pepto-Bismol and we discussed oral hydration.  We discussed the brat diet.  I do not see her back sooner if spikes a fever, blood in the stool, or worsening symptoms.  She will watch for signs of dehydration.  Discussed this very well could be viral, but just want to make sure there is no other infection.  - C. difficile Toxigenic by PCR; Future  - Ova and Parasite, Stool; Future  - Culture, Stool; Future  - Cryptosporidium/Giardia Combo, Stool; Future      Subjective:       Lorraine Harris is a 34 y.o. female who presents for evaluation of diarrhea.  Patient states that symptoms started 6 days ago.  She initially had a lot of nausea and headache.  She felt almost presyncopal.  She then started to have diarrhea.  She states since then, she has had multiple watery stools daily.  She states anytime she eats or drinks, she will have a watery stool.  She is not having abdominal pain, just gassiness, bloating this, and cramping.  She is still having occasional nausea.  She is never had any vomiting.  She has not had any fevers or blood in the stool.  No one else at home has been sick.  She denies being on antibiotics recently.  The only thing she can think of is the night before she had a chicken sandwich at [employer].  She said no recent travel and no unusual pets.  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  She did not go to work today due to the frequency of diarrhea.  She feels like she is trying to keep herself hydrated and has been just a little bit lightheaded.  She is been trying to follow a brat diet and did take some Pepto-Bismol over  "the weekend.  She does admit that in the past she has had somewhat of a sensitive stomach.  She is never been able to figure out any triggers, but it is never lasted this long.    Relevant past medical, family, surgical, and social history reviewed with patient, unless noted in HPI, not pertinent for this visit.  Medications were discussed and reconciled.   Review of Systems   A 12 point comprehensive review of systems was negative except as noted.      Current Outpatient Medications   Medication Sig Dispense Refill     multivitamin with minerals (HAIR,SKIN AND NAILS ORAL) Take by mouth.       venlafaxine 37.5 mg TR24 Take 37.5 mg by mouth daily. 30 each 2     dextroamphetamine-amphetamine (ADDERALL XR) 15 MG 24 hr capsule Take 1 capsule (15 mg total) by mouth daily. 30 capsule 0     prenatal vitamin iron-folic acid 27mg-0.8mg (PRENATAL S) 27 mg iron- 800 mcg Tab tablet Take 1 tablet by mouth daily.       No current facility-administered medications for this visit.        Objective:      /72   Pulse 84   Temp 98.8  F (37.1  C) (Oral)   Resp 16   Ht 5' 3\" (1.6 m)   Wt 169 lb 8 oz (76.9 kg)   LMP 05/19/2019   BMI 30.03 kg/m        General appearance: alert, appears stated age and cooperative  Head: Normocephalic, without obvious abnormality, atraumatic  Eyes: conjunctivae/corneas clear.   Ears: normal TM's and external ear canals both ears  Nose: Nares normal. Septum midline. Mucosa normal. No drainage or sinus tenderness.  Throat: lips, mucosa, and tongue normal; teeth and gums normal  Neck: no adenopathy  Lungs: clear to auscultation bilaterally  Heart: regular rate and rhythm, S1, S2 normal, no murmur, click, rub or gallop  Abdomen: soft, non-tender; bowel sounds normal; no masses,  no organomegaly      No results found for this or any previous visit (from the past 168 hour(s)).       This note has been dictated using voice recognition software. Any grammatical or context distortions are unintentional " and inherent to the software

## 2021-05-29 NOTE — TELEPHONE ENCOUNTER
Controlled Substance Refill Request  Medication:   Requested Prescriptions     Pending Prescriptions Disp Refills     dextroamphetamine-amphetamine (ADDERALL XR) 15 MG 24 hr capsule 30 capsule 0     Sig: Take 1 capsule (15 mg total) by mouth daily.     Date Last Fill: 4/22/19  Pharmacy: [facility] in [facility]   Submit electronically to pharmacy  Controlled Substance Agreement on File: None  Encounter-Level CSA Scan Date:    There are no encounter-level csa scan date.       Last office visit: 5/28/2019 Jeannie Armando MD

## 2021-05-30 VITALS — HEIGHT: 63 IN | WEIGHT: 161 LBS | BODY MASS INDEX: 28.53 KG/M2

## 2021-05-30 VITALS — BODY MASS INDEX: 29.32 KG/M2 | WEIGHT: 165.5 LBS | HEIGHT: 63 IN

## 2021-05-30 VITALS — BODY MASS INDEX: 29.59 KG/M2 | HEIGHT: 63 IN | WEIGHT: 167 LBS

## 2021-05-30 NOTE — PROGRESS NOTES
Assessment/ Plan     1. Acute vaginitis  Patient comes in today for some vaginal itching and irritation.  Her wet prep was positive for clue cells.  Will treat with metronidazole 500 twice daily for 7 days.  Reviewed potential adverse side effects.  She will a bit of perioral dermatitis which I would just have her watch for now.  This may improve with the antibiotics.  We could also do a trial of doxycycline later if things are not improving.  - Wet Prep, Vaginal  - metroNIDAZOLE (FLAGYL) 500 MG tablet; Take 1 tablet (500 mg total) by mouth 2 (two) times a day for 7 days.  Dispense: 14 tablet; Refill: 0      Subjective:       Lorraine Harris is a 34 y.o. female who presents for possible yeast infection.  Patient states that last week she was having a lot of vaginal itching and irritation.  She states that she did an over-the-counter Monistat and that seemed to help somewhat.  However symptoms have returned.  [place] to check things out before she left.  She said no new partners.  There is been no dysuria, urgency, or frequency.  She is also had a little rash on her chin consisting of small little pimples.    Relevant past medical, family, surgical, and social history reviewed with patient, unless noted in HPI, not pertinent for this visit.  Medications were discussed and reconciled.   Review of Systems   A 12 point comprehensive review of systems was negative except as noted.      Current Outpatient Medications   Medication Sig Dispense Refill     dextroamphetamine-amphetamine (ADDERALL XR) 15 MG 24 hr capsule Take 1 capsule (15 mg total) by mouth daily. 30 capsule 0     multivitamin with minerals (HAIR,SKIN AND NAILS ORAL) Take by mouth.       prenatal vitamin iron-folic acid 27mg-0.8mg (PRENATAL S) 27 mg iron- 800 mcg Tab tablet Take 1 tablet by mouth daily.       metroNIDAZOLE (FLAGYL) 500 MG tablet Take 1 tablet (500 mg total) by mouth 2 (two)  "times a day for 7 days. 14 tablet 0     No current facility-administered medications for this visit.        Objective:      /68   Pulse 64   Temp 98.2  F (36.8  C)   Resp 16   Ht 5' 3\" (1.6 m)   Wt 172 lb (78 kg)   LMP 05/19/2019   BMI 30.47 kg/m        General appearance: alert, appears stated age and cooperative   exam: Normal external, scant amount of whitish discharge, no odor  Skin exam: Small skin colored papules along the left chin    Recent Results (from the past 168 hour(s))   Wet Prep, Vaginal   Result Value Ref Range    Yeast Result No yeast seen No yeast seen    Trichomonas No Trichomonas seen No Trichomonas seen    Clue Cells, Wet Prep Clue cells seen (!) No Clue cells seen          This note has been dictated using voice recognition software. Any grammatical or context distortions are unintentional and inherent to the software  "

## 2021-05-30 NOTE — TELEPHONE ENCOUNTER
----- Message from Jeannie Armando MD sent at 7/9/2019  3:36 PM CDT -----  Let pt know that wet prep pos for bacteria - sent rx to pharmacy for metronidazole to her pharmacy - this may improve the facial rash as well

## 2021-05-30 NOTE — TELEPHONE ENCOUNTER
Covering-  See pt's [employer] message and reply via [employer].  Pt last seen on 7-9-19 by Dr. Armando.  OV note states:    . Acute vaginitis  Patient comes in today for some vaginal itching and irritation.  Her wet prep was positive for clue cells.  Will treat with metronidazole 500 twice daily for 7 days.  Reviewed potential adverse side effects.  She will a bit of perioral dermatitis which I would just have her watch for now.  This may improve with the antibiotics.  We could also do a trial of doxycycline later if things are not improving.  - Wet Prep, Vaginal  - metroNIDAZOLE (FLAGYL) 500 MG tablet; Take 1 tablet (500 mg total) by mouth 2 (two) times a day for 7 days.  Dispense: 14 tablet; Refill

## 2021-05-30 NOTE — TELEPHONE ENCOUNTER
Covering-  See mychart refill request.  Last seen on 7-9-19.  She does not have enough to get through until AM returns. Last filled 6-20-19.  Rx queued for MD approval.

## 2021-05-30 NOTE — TELEPHONE ENCOUNTER
Covering for Dr. Armando who is out of town this week.  Refilled Adderall for 1 month.  Reviewed [facility], patient is appropriately requesting.  Not prescribed any other controlled substances.  Blood pressure is normal.  Christina Ugalde, DO

## 2021-05-30 NOTE — TELEPHONE ENCOUNTER
Reason contacted:  Lab results  Information relayed:  MD message below  Additional questions:  No  Further follow-up needed:  No  Okay to leave a detailed message:  CHARLENE

## 2021-05-31 VITALS — WEIGHT: 174 LBS | HEIGHT: 63 IN | BODY MASS INDEX: 30.83 KG/M2

## 2021-05-31 VITALS — BODY MASS INDEX: 31.71 KG/M2 | HEIGHT: 63 IN | WEIGHT: 179 LBS

## 2021-05-31 NOTE — TELEPHONE ENCOUNTER
Controlled Substance Refill Request  Medication:   Requested Prescriptions     Pending Prescriptions Disp Refills     dextroamphetamine-amphetamine (ADDERALL XR) 15 MG 24 hr capsule 30 capsule 0     Sig: Take 1 capsule (15 mg total) by mouth daily.     Date Last Fill: 07/20/19  [facility]: [facility]   Submit electronically to pharmacy  Controlled Substance Agreement on File:   Encounter-Level CSA Scan Date:    There are no encounter-level csa scan date.       Last office visit: 7/9/2019 Jeannie Armando MD

## 2021-06-01 NOTE — TELEPHONE ENCOUNTER
Called pt, notified that she needs to p/u rx with electronic system down. Pt agrees, states that her spouse Kenneth will p/u rx. DANIELLE Garcia will place rx at  for p/u.

## 2021-06-01 NOTE — TELEPHONE ENCOUNTER
Controlled Substance Refill Request  Medication:   Requested Prescriptions     Pending Prescriptions Disp Refills     dextroamphetamine-amphetamine (ADDERALL XR) 15 MG 24 hr capsule 30 capsule 0     Sig: Take 1 capsule (15 mg total) by mouth daily.     Date Last Fill: 8/16/19  Pharmacy: [id number]   Submit electronically to pharmacy  Controlled Substance Agreement on File:   Encounter-Level CSA Scan Date:    There are no encounter-level csa scan date.       Last office visit: Last office visit pertaining to requested medication was 7/9/19.

## 2021-06-02 ENCOUNTER — RECORDS - HEALTHEAST (OUTPATIENT)
Dept: ADMINISTRATIVE | Facility: CLINIC | Age: 36
End: 2021-06-02

## 2021-06-02 VITALS — BODY MASS INDEX: 30.48 KG/M2 | WEIGHT: 172 LBS | HEIGHT: 63 IN

## 2021-06-02 VITALS — WEIGHT: 196 LBS | BODY MASS INDEX: 34.73 KG/M2 | HEIGHT: 63 IN

## 2021-06-02 VITALS — WEIGHT: 170 LBS | BODY MASS INDEX: 30.12 KG/M2 | HEIGHT: 63 IN

## 2021-06-02 NOTE — TELEPHONE ENCOUNTER
Controlled Substance Refill Request  Medication:   Requested Prescriptions     Pending Prescriptions Disp Refills     dextroamphetamine-amphetamine (ADDERALL XR) 15 MG 24 hr capsule 30 capsule 0     Sig: Take 1 capsule (15 mg total) by mouth daily.     Date Last Fill: 9/26/19  Pharmacy: [id number]   Submit electronically to pharmacy  Controlled Substance Agreement on File:   Encounter-Level CSA Scan Date:    There are no encounter-level csa scan date.       Last office visit: Last office visit pertaining to requested medication was 7/9/19.

## 2021-06-03 VITALS — BODY MASS INDEX: 30.03 KG/M2 | WEIGHT: 169.5 LBS | HEIGHT: 63 IN

## 2021-06-03 VITALS — WEIGHT: 172 LBS | BODY MASS INDEX: 30.48 KG/M2 | HEIGHT: 63 IN

## 2021-06-03 NOTE — TELEPHONE ENCOUNTER
Controlled Substance Refill Request  Medication:   Requested Prescriptions     Pending Prescriptions Disp Refills     dextroamphetamine-amphetamine (ADDERALL XR) 15 MG 24 hr capsule 30 capsule 0     Sig: Take 1 capsule (15 mg total) by mouth daily.     Date Last Fill: 10.28.19  [facility]   Submit electronically to pharmacy  Controlled Substance Agreement on File:   Encounter-Level CSA Scan Date:    There are no encounter-level csa scan date.       Last office visit: Last office visit pertaining to requested medication was 7.9.19.

## 2021-06-04 NOTE — TELEPHONE ENCOUNTER
venlafaxine 37.5 mg TR24 [[id number]]     Electronically signed by: Jeannie Armando MD on 04/22/19 1517 Status: Discontinued   Ordering user: Jeannie Armando MD 04/22/19 1517 Authorized by: Jeannie Armando MD   Frequency: DAILY 04/22/19 - 07/09/19  Discontinued by: Ivett Davis, [facility] 07/09/19 1427 [Side effects]

## 2021-06-04 NOTE — TELEPHONE ENCOUNTER
Controlled Substance Refill Request  Medication:   Requested Prescriptions     Pending Prescriptions Disp Refills     dextroamphetamine-amphetamine (ADDERALL XR) 15 MG 24 hr capsule 30 capsule 0     Sig: Take 1 capsule (15 mg total) by mouth daily.     Date Last Fill: 12/3/19  [facility]   Submit electronically to pharmacy  Controlled Substance Agreement on File:   Encounter-Level CSA Scan Date:    There are no encounter-level csa scan date.       Last office visit: 7/9/2019 Jeannie Armando MD

## 2021-06-05 NOTE — TELEPHONE ENCOUNTER
Controlled Substance Refill Request  Medication Name:   Requested Prescriptions     Pending Prescriptions Disp Refills     dextroamphetamine-amphetamine (ADDERALL XR) 15 MG 24 hr capsule 30 capsule 0     Sig: Take 1 capsule (15 mg total) by mouth daily.     Date Last Fill:   dextroamphetamine-amphetamine (ADDERALL XR) 15 MG 24 hr capsule 30 capsule 0 1/2/2020  No   Sig - Route: Take 1 capsule (15 mg total) by mouth daily. - Oral   Sent to pharmacy as: dextroamphetamine-amphetamine ER 15 mg 24hr capsule,extend release (ADDERALL XR)   Earliest Fill Date: 1/2/2020   E-Prescribing Status: Receipt confirmed by pharmacy (1/2/2020  3:37 PM CST)   [facility]ed [facility]  Submit electronically to pharmacy  Controlled Substance Agreement on file:   Encounter-Level CSA Scan Date:    There are no encounter-level csa scan date.        Last office visit:  7/9/19

## 2021-06-06 NOTE — TELEPHONE ENCOUNTER
Controlled Substance Refill Request  Medication Name:   Requested Prescriptions     Pending Prescriptions Disp Refills     dextroamphetamine-amphetamine (ADDERALL XR) 15 MG 24 hr capsule 30 capsule 0     Sig: Take 1 capsule (15 mg total) by mouth daily.     Date Last Fill: 2/4/20  [facility]ed [facility]  Submit electronically to pharmacy  Controlled Substance Agreement on file:   Encounter-Level CSA Scan Date:    There are no encounter-level csa scan date.        Last office visit:  7/9/19

## 2021-06-07 NOTE — PROGRESS NOTES
"Lorraine Harris is a 35 y.o. female who is being evaluated via a billable video visit.      The patient has been notified of following:     \"This video visit will be conducted via a call between you and your physician/provider. We have found that certain health care needs can be provided without the need for an in-person physical exam.  This service lets us provide the care you need with a video conversation.  If a prescription is necessary we can send it directly to your pharmacy.  If lab work is needed we can place an order for that and you can then stop by our lab to have the test done at a later time.    Video visits are billed at different rates depending on your insurance coverage. Please reach out to your insurance provider with any questions.    If during the course of the call the physician/provider feels a video visit is not appropriate, you will not be charged for this service.\"    Patient has given verbal consent to a Video visit? Yes    Patient would like to receive their AVS by AVS Preference: Daja.    Patient would like the video invitation sent by: Text to cell phone: [phone]    Video Start Time: 2:55    Additional provider notes:  Assessment/ Plan     1. Otalgia, left  Patient has had a little over a week history of left ear pain, pressure, and some clear drainage.  With our being able to do an exam, this is challenging to assess.  Options with patient, we will treat her in a stepwise approach.  As it would be more common to have an otitis externa in an adult, we will have her try some Cortisporin eardrops for 3 to 4 days.  If there is no improvement with symptoms at that time, she can fill the amoxicillin and take that.  Would recommend that she continue with the Flonase and a decongestant as she likely is having some eustachian tube dysfunction as well.  She will update me if things are not feeling better over the next several weeks.  - neomycin-polymyxin-hydrocortisone (CORTISPORIN) otic " solution; Administer 3 drops into the left ear 3 (three) times a day for 10 days.  Dispense: 10 mL; Refill: 0  - amoxicillin (AMOXIL) 500 MG tablet; Take 1 tablet (500 mg total) by mouth 2 (two) times a day for 10 days.  Dispense: 20 tablet; Refill: 0      Subjective:       Lorraine Harris is a 35 y.o. female who presents for a video visit for some ear pain.  Patient states that about 8 days ago have some left ear pain.  She describes it as more of a muffled sensation and pressure.  She would occasionally get sharp pains.  Several days ago it was radiating into her jaw and her neck.  She started using some over-the-counter swimmer eardrops and this actually helped somewhat.  She no longer having the neck or jaw symptoms.  She also tried some Sudafed and is using some Flonase nasal spray.  She is having both the abrasion today.  She is occasionally having a cough.  She does not feel overly ill like a fever.  She is never had an ear infection as an adult.  She does not think that she has seasonal allergies.  She has not been swimming.  She has noticed some clear drainage out of the ear, especially when she first wakes up in the morning.  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  She had her  try to look in there, but this was not overly helpful.    Relevant past medical, family, surgical, and social history reviewed with patient, unless noted in HPI, not pertinent for this visit.  Medications were discussed and reconciled.   Review of Systems   A 12 point comprehensive review of systems was negative except as noted.      Current Outpatient Medications   Medication Sig Dispense Refill     dextroamphetamine-amphetamine (ADDERALL XR) 15 MG 24 hr capsule Take 1 capsule (15 mg total) by mouth daily. 30 capsule 0     dextroamphetamine-amphetamine (ADDERALL XR) 15 MG 24 hr capsule Take 1 capsule (15 mg total) by mouth daily. 30 capsule 0     erythromycin with ethanoL (EMGEL) 2 % gel Apply topically 2 (two)  times a day. 30 g 2     multivitamin with minerals (HAIR,SKIN AND NAILS ORAL) Take by mouth.       prenatal vitamin iron-folic acid 27mg-0.8mg (PRENATAL S) 27 mg iron- 800 mcg Tab tablet Take 1 tablet by mouth daily.       venlafaxine (EFFEXOR-XR) 37.5 MG 24 hr capsule Take 1 capsule (37.5 mg total) by mouth daily. 90 capsule 1     amoxicillin (AMOXIL) 500 MG tablet Take 1 tablet (500 mg total) by mouth 2 (two) times a day for 10 days. 20 tablet 0     neomycin-polymyxin-hydrocortisone (CORTISPORIN) otic solution Administer 3 drops into the left ear 3 (three) times a day for 10 days. 10 mL 0     No current facility-administered medications for this visit.        Objective:      There were no vitals taken for this visit.      General appearance: alert, appears stated age and cooperative           No results found for this or any previous visit (from the past 168 hour(s)).       This note has been dictated using voice recognition software. Any grammatical or context distortions are unintentional and inherent to the software      Video-Visit Details    Type of service:  Video Visit    Video End Time (time video stopped): 3:10    Originating Location (pt. Location): Home    Distant Location (provider location):  [facility]/OB     Mode of Communication:  Video Conference via Marcos Armando MD

## 2021-06-07 NOTE — TELEPHONE ENCOUNTER
Controlled Substance Refill Request  Medication Name:   Requested Prescriptions     Pending Prescriptions Disp Refills     dextroamphetamine-amphetamine (ADDERALL XR) 15 MG 24 hr capsule 30 capsule 0     Sig: Take 1 capsule (15 mg total) by mouth daily.     Date Last Fill: 3/3/20  [facility]ed [facility]  Submit electronically to pharmacy  Controlled Substance Agreement on file:   Encounter-Level CSA Scan Date:    There are no encounter-level csa scan date.        Last office visit:  7/9/19

## 2021-06-07 NOTE — PROGRESS NOTES
SUBJECTIVE: Here for curbside evaluation for COVID-19 referred through [facility]. Confirmed [facility] bad with name and date of birth for specimen collection.   Patient reports no new symptoms.     OBJECTIVE:   In no apparent distress  Eyes appear normal  Mucous membranes moist  Non diaphoretic   No increased work of breathing   Mental status appears normal/affect normal       1. Cough      Over the counter meds and isolation discussed until results in from [facility] team.  Brief education provided. Time of visit was 15 minutes more than half in coordination of care and counseling regarding COVID and self-isolation.      Sandra CARL, CNP

## 2021-06-08 NOTE — TELEPHONE ENCOUNTER
See [facility] request to increase dosage for venlafaxine. Rx is queued if appropriate for MD approval.

## 2021-06-08 NOTE — PROGRESS NOTES
"  Subjective:       Lorraine Harris is a 32 y.o. female who presents for confirmation of pregnancy.  She reports that her LMP was somewhere between 12/24 and 12/30.  She was having irregular menses prior to this, and reports that her last negative pregnancy test was 2/2, and she had a positive home pregnancy test on 2/8.  Based on her LMP, she would be just over 7 weeks gestation.  She stopped several of her medications when she found out she was pregnant, including her stimulant medication for ADHD and her antidepressant/anti-anxiety medications Cymbalta and Effexor.  She reports she has been a bit anxious since stopping these meds.  She also stopped taking her omeprazole for GERD.  Overall she has been feeling pretty well.  She has experienced some fatigue and mild nausea, no vomiting yet.      The following portions of the patient's history were reviewed and updated as appropriate: allergies, current medications and problem list.    Review of Systems   Pertinent items are noted in HPI.      Objective:        Visit Vitals     /68 (Patient Site: Left Arm, Patient Position: Sitting, Cuff Size: Adult Regular)     Pulse 88     Resp 16     Ht 5' 3\" (1.6 m)     Wt 165 lb 8 oz (75.1 kg)     LMP 12/24/2016 (Exact Date)     Breastfeeding No     BMI 29.32 kg/m2       General appearance: alert, appears stated age and cooperative  Lungs: clear to auscultation bilaterally  Heart: regular rate and rhythm, S1, S2 normal, no murmur, click, rub or gallop  Psychiatric: speech is fluent and thought process is linear, affect is reactive and appropriate, mood is described as mildly depressed and anxious        Results for orders placed or performed in visit on 02/13/17   Pregnancy, Urine   Result Value Ref Range    Pregnancy Test, Urine Positive (!) Negative          Assessment/Plan:       1. Less than 8 weeks gestation of pregnancy/Unsure of LMP (last menstrual period) as reason for ultrasound scan  Recommended ultrasound to " confirm dates due to irregular menses and unsure LMP, also positive test that does not necessarily correlate with LMP.  Recommended waiting a couple of weeks to have this performed, patient will schedule this herself.  Follow-up to be determined based on dating ultrasound, between 10 and 12 weeks gestation.  - US OB < 14 Weeks    3. Gastroesophageal reflux disease without esophagitis  Recommended stopping PPI and changing over to OTC ranitidine at bedtime if symptoms allow.    4. Attention Deficit Disorder Without Hyperactivity  Patient stopped her stimulant medication and plans to stay off of this during pregnancy.    5. Depression With Anxiety  Stopped Cymbalta and Effexor.  Discussed risks vs benefits of medications for anxiety and/or depression in pregnancy, and patient wishes to wait until after the first trimester prior to restarting a medication if needed.  This will be an ongoing discussion.    6. Amenorrhea  - Pregnancy, Urine       I spent a total of 25 minutes with the patient today, >50% in face-to-face counseling and coordination of care in relation to the above issues.

## 2021-06-08 NOTE — TELEPHONE ENCOUNTER
Controlled Substance Refill Request  Medication Name:   Requested Prescriptions     Pending Prescriptions Disp Refills     dextroamphetamine-amphetamine (ADDERALL XR) 15 MG 24 hr capsule 30 capsule 0     Sig: Take 1 capsule (15 mg total) by mouth daily.     Date Last Fill: 4/3/20  [facility]  Submit electronically to pharmacy  Controlled Substance Agreement on file:   Encounter-Level CSA Scan Date:    There are no encounter-level csa scan date.        Last office visit:  4/15/20

## 2021-06-08 NOTE — PROGRESS NOTES
"  Chief Complaint   Patient presents with     Facial Pain     entire face, body aches, yellow/green phlegm x2 days     Ear Pain     pain and ringing - rt ear mostly         HPI:   Lorraine Harris is a 32 y.o. female c/o head congestion, with ear pressure and facial pressure for the last two days.  Has been quite tired for the last two weeks.  Sore throat.  Coughing, slight productive.  No shortness of breath   No fever.  Has used tylenol, dayquil--has helped.  Daughter has had cold recently.  LMP 12/24/16--wondering if pregnant.  Cycles are about 40 days.    ROS:  Constitutional: normal appetite  Eyes: burning and dry  ENT: as per HPI  Respiratory: as per HPI   CV: negative   GI: no vomiting/diarrhea  : negative   SKIN: no change  MS: myalgias  NEURO: negative      Medications:  Current Outpatient Prescriptions on File Prior to Visit   Medication Sig Dispense Refill     acetaminophen (TYLENOL) 500 MG tablet Take 500 mg by mouth every 6 (six) hours as needed for pain.       ADDERALL XR 30 mg 24 hr capsule TK ONE C PO  BID AT 8 AM AND 12 PM  0     DULoxetine (CYMBALTA) 60 MG capsule TK ONE C PO D  4     ibuprofen (ADVIL,MOTRIN) 200 MG tablet Take 200 mg by mouth every 6 (six) hours as needed for pain.       omeprazole (PRILOSEC) 20 MG capsule TAKE TWO CAPSULES BY MOUTH EVERY  capsule 0     No current facility-administered medications on file prior to visit.          Social History:  Social History   Substance Use Topics     Smoking status: Never Smoker     Smokeless tobacco: Not on file     Alcohol use No         Physical Exam:   Vitals:    01/26/17 1451   BP: 122/78   Patient Site: Left Arm   Patient Position: Sitting   Cuff Size: Adult Regular   Pulse: 96   Resp: 16   Temp: 98.6  F (37  C)   TempSrc: Oral   SpO2: 99%   Weight: 161 lb (73 kg)   Height: 5' 3\" (1.6 m)       GENERAL:   Alert. Oriented.  EYES: Clear  HENT:  Ears: R TM pearly gray. Normal landmarks. L TM pearly gray.  Normal landmarks  Nose: " Clear.  Sinuses: Nontender.  Oropharynx:  No erythema. No exudate.  NECK: Supple. No adenopathy.  LUNGS: Clear to ascultation.  No crackles.  No wheezing  HEART: RRR  SKIN:  No rash.     LABS:  Results for orders placed or performed in visit on 01/26/17   Pregnancy (Beta-hCG, Qual), Urine   Result Value Ref Range    Pregnancy Test, Urine Negative Negative    Specific Gravity, UA 1.020 1.001 - 1.030        Assessment/Plan:    1. Missed period  Pregnancy (Beta-hCG, Qual), Urine   2. Acute URI          Discussed viral infection.  No antibiotics indicated.   Patient Instructions   Afrin nasal spray for nasal congestion.  Tylenol as needed.  benedryl or claritin.  Saline nasal spray  Warm moist compress to face.  Steamy shower.  Honey for the cough.      Given note to be off work 1/27-1/28/17      The following portions of the patient's history were reviewed and updated as appropriate: allergies, current medications, past family history, past medical history, past social history, past surgical history and problem list.    Rboin Bruno MD      1/26/2017

## 2021-06-08 NOTE — TELEPHONE ENCOUNTER
Controlled Substance Refill Request  Medication Name:   Requested Prescriptions     Pending Prescriptions Disp Refills     dextroamphetamine-amphetamine (ADDERALL XR) 15 MG 24 hr capsule 30 capsule 0     Sig: Take 1 capsule (15 mg total) by mouth daily.     Date Last Fill: 5/7/20  [facility]  Submit electronically to pharmacy  Controlled Substance Agreement on file:   Encounter-Level CSA Scan Date:    There are no encounter-level csa scan date.        Last office visit:  4/15/20

## 2021-06-09 NOTE — ANESTHESIA PREPROCEDURE EVALUATION
Anesthesia Evaluation      Patient summary reviewed   No history of anesthetic complications     Airway   Mallampati: II  Neck ROM: full   Pulmonary - normal exam   (-) shortness of breath, recent URI, sleep apnea                         Cardiovascular - normal exam  Exercise tolerance: > or = 4 METS  (+) hypertension, ,     (-) angina   Neuro/Psych - negative ROS     Endo/Other    (+) pregnant     GI/Hepatic/Renal    (+) GERD,   chronic renal disease,           Dental - normal exam                        Anesthesia Plan  Planned anesthetic: general endotracheal  RSI with succinylcholine.  Patient ate at 1200 noon and surgeon will have to declare emergent case to proceed before NPO status clear  ASA 2 - emergent     Anesthetic plan and risks discussed with: patient  Anesthesia plan special considerations: rapid sequence induction, antiemetics,   Post-op plan: routine recovery

## 2021-06-09 NOTE — TELEPHONE ENCOUNTER
Controlled Substance Refill Request  Medication Name:   Requested Prescriptions     Pending Prescriptions Disp Refills     dextroamphetamine-amphetamine (ADDERALL XR) 15 MG 24 hr capsule 30 capsule 0     Sig: Take 1 capsule (15 mg total) by mouth daily.     dextroamphetamine-amphetamine (ADDERALL XR) 15 MG 24 hr capsule 30 capsule 0     Sig: Take 1 capsule (15 mg total) by mouth daily.     Date Last Fill: 6/3/20  [facility]  Submit electronically to pharmacy  Controlled Substance Agreement on file:   Encounter-Level CSA Scan Date:    There are no encounter-level csa scan date.        Last office visit:  4/15/20

## 2021-06-09 NOTE — PROGRESS NOTES
"  Subjective:       Lorraine Harris is a 32 y.o. female who presents for evaluation of abdominal pain in early pregnancy.  Patient describes sharp, throbbing pain in her lower abdomen from hip to hip, also radiating up the lower back.  She initially had this pain 4 days ago, but this seemed to resolve on its own.  This morning, she described a sharp episode of pain in her rectum.  This made her lightheaded and nauseated.  She feels as though she is \"being stabbed\" with the abdominal pain.  This morning the pain has been more on the right side.  She denies any blood in the urine or dysuria.  Based on her LMP, she is just over 8 weeks gestation, however we suspected based on lack of a positive home pregnancy test, she is probably around 6-1/2 weeks gestation.  See previous note for details.  She has an order for an early ultrasound for dating purposes, but I recommended putting this out a couple of weeks to hopefully ensure that she is at least 8 weeks gestation at the time of this ultrasound.  She denies any vaginal discharge, itching or burning.  Her stools have been normal.    The following portions of the patient's history were reviewed and updated as appropriate: allergies, current medications and problem list.    Review of Systems   Pertinent items are noted in HPI.      Objective:        Visit Vitals     /68 (Patient Site: Left Arm, Patient Position: Sitting, Cuff Size: Adult Regular)     Pulse 72     Temp 97.5  F (36.4  C) (Oral)     Resp 20     Ht 5' 3\" (1.6 m)     Wt 167 lb (75.8 kg)     LMP 12/24/2016 (Exact Date)     Breastfeeding No     BMI 29.58 kg/m2       General appearance: alert, appears stated age and cooperative  Lungs: clear to auscultation bilaterally  Heart: regular rate and rhythm, S1, S2 normal, no murmur, click, rub or gallop  Abdomen: soft, mildly tender to palpation in the suprapubic region and right lower quadrant, no masses palpated, no rebound tenderness        Results for orders " placed or performed in visit on 02/22/17   Urinalysis-UC if Indicated   Result Value Ref Range    Color, UA Yellow Colorless, Yellow, Straw, Light Yellow    Clarity, UA Clear Clear    Glucose, UA Negative Negative    Bilirubin, UA Negative Negative    Ketones, UA Negative Negative    Specific Gravity, UA 1.015 1.002 - 1.030    Blood, UA Negative Negative    pH, UA 6.0 4.5 - 8.0    Protein, UA Negative Negative mg/dL    Urobilinogen, UA 0.2 E.U./dL 0.2 E.U./dL, 1.0 E.U./dL    Nitrite, UA Negative Negative    Leukocytes, UA Negative Negative   HM2(CBC w/o Differential)   Result Value Ref Range    WBC 11.3 (H) 4.0 - 11.0 thou/uL    RBC 4.14 3.80 - 5.40 mill/uL    Hemoglobin 11.8 (L) 12.0 - 16.0 g/dL    Hematocrit 35.5 35.0 - 47.0 %    MCV 86 80 - 100 fL    MCH 28.5 27.0 - 34.0 pg    MCHC 33.2 32.0 - 36.0 g/dL    RDW 12.6 11.0 - 14.5 %    Platelets 491 (H) 140 - 440 thou/uL    MPV 5.9 (L) 7.0 - 10.0 fL     Pelvic ultrasound: Left adnexal ectopic pregnancy at 6 weeks 3 days gestation with heart rate of 117, moderate amount of free fluid in the pelvis, no intrauterine pregnancy     Assessment/Plan:       1. Abdominal pregnancy without intrauterine pregnancy  Called and spoke with OB hospitalist Dr. Llamas who will evaluate patient and likely plan surgery later today.  Called and spoke with patient at ultrasound and informed her of results and plan for the day.  She is hemodynamically stable.    2. Abdominal pain affecting pregnancy  - Urinalysis-UC if Indicated  - HM2(CBC w/o Differential)  - US OB < 14 Weeks With Transvaginal    3. Pregnancy  - US OB < 14 Weeks With Transvaginal

## 2021-06-09 NOTE — ANESTHESIA POSTPROCEDURE EVALUATION
Patient: Lorraine Harris  LAPAROSCOPY, LEFT SALPINGECTOMY  Anesthesia type: general    Patient location: PACU  Last vitals:   Vitals:    02/22/17 2040   BP: 114/59   Pulse: 90   Resp: 25   Temp:    SpO2: 100%     Post vital signs: stable  Level of consciousness: awake and responds to simple questions  Post-anesthesia pain: pain controlled  Post-anesthesia nausea and vomiting: no  Pulmonary: unassisted, return to baseline  Cardiovascular: stable and blood pressure at baseline  Hydration: adequate  Anesthetic events: no    QCDR Measures:  ASA# 11 - Monae-op Cardiac Arrest: ASA11B - Patient did NOT experience unanticipated cardiac arrest  ASA# 12 - Monae-op Mortality Rate: ASA12B - Patient did NOT die  ASA# 13 - PACU Re-Intubation Rate: ASA13B - Patient did NOT require a new airway mgmt  ASA# 10 - Composite Anes Safety: ASA10A - No serious adverse event  ASA# 38 - New Corneal Injury: ASA38A - No new exposure keratitis or corneal abrasion in PACU    Additional Notes:

## 2021-06-09 NOTE — ANESTHESIA CARE TRANSFER NOTE
Last vitals:   Vitals:    02/22/17 1933   BP: 120/57   Pulse: (!) 110   Resp: 20   Temp: 36.7  C (98  F)   SpO2: 100%     Patient's level of consciousness is drowsy  Spontaneous respirations: yes  Maintains airway independently: yes  Dentition unchanged: yes  Oropharynx: oropharynx clear of all foreign objects    QCDR Measures:  ASA# 20 - Surgical Safety Checklist: ASA20A - Safety Checks Done  PQRS# 430 - Adult PONV Prevention: 4558F - Pt received => 2 anti-emetic agents (different classes) preop & intraop  ASA# 8 - Peds PONV Prevention: NA - Not pediatric patient, not GA or 2 or more risk factors NOT present  PQRS# 424 - Monae-op Temp Management: 4559F - At least one body temp DOCUMENTED => 35.5C or 95.9F within required timeframe  PQRS# 426 - PACU Transfer Protocol: - Transfer of care checklist used  ASA# 14 - Acute Post-op Pain: ASA14B - Patient did NOT experience pain >= 7 out of 10    I completed my SBAR handoff to the receiving nurse per policy and procedure.

## 2021-06-10 NOTE — TELEPHONE ENCOUNTER
Controlled Substance Refill Request  Medication Name:   Requested Prescriptions     Pending Prescriptions Disp Refills     dextroamphetamine-amphetamine (ADDERALL XR) 15 MG 24 hr capsule 30 capsule 0     Sig: Take 1 capsule (15 mg total) by mouth daily.     Date Last Fill: 7//20  Requested Pharmacy: [facility]  Submit electronically to pharmacy  Controlled Substance Agreement on file:   Encounter-Level CSA Scan Date:    There are no encounter-level csa scan date.        Last office visit:  4/15/20

## 2021-06-11 NOTE — PROGRESS NOTES
"Lorraine Harris is a 35 y.o. female who is being evaluated via a billable video visit.      The patient has been notified of following:     \"This video visit will be conducted via a call between you and your physician/provider. We have found that certain health care needs can be provided without the need for an in-person physical exam.  This service lets us provide the care you need with a video conversation.  If a prescription is necessary we can send it directly to your pharmacy.  If lab work is needed we can place an order for that and you can then stop by our lab to have the test done at a later time.    Video visits are billed at different rates depending on your insurance coverage. Please reach out to your insurance provider with any questions.    If during the course of the call the physician/provider feels a video visit is not appropriate, you will not be charged for this service.\"    Patient has given verbal consent to a Video visit? Yes  How would you like to obtain your AVS? AVS Preference: MyChart.  If dropped by the video visit, the video invitation should be sent to: Text to cell phone: [phone]  Will anyone else be joining your video visit? No        Video Start Time: 11:20    Additional provider notes:   Assessment/ Plan     1. Dacrocystitis, left  She has had at least a week of redness, swelling, and drainage along the medial aspect of the left thigh and below.  She has had this intermittently over the last year and I suspect she may have dacryocystocele.  We will put her on antibiotics as it seems to be infected at this point.  We will have her follow-up with ophthalmology in the next week or so to discuss more definitive treatment.  Discussed potential adverse side effects of the antibiotics including GI upset and diarrhea.  She will let me know if symptoms do not seem to be improving over the next several days of starting antibiotics.  - clindamycin (CLEOCIN) 300 MG capsule; Take 1 capsule (300 " mg total) by mouth 3 (three) times a day for 10 days.  Dispense: 30 capsule; Refill: 0    2. Eczema, unspecified type  She is having dyshidrotic eczema on her hands.  We will have her try a stronger steroid ointment to see if this will help with symptoms.  - triamcinolone (KENALOG) 0.1 % ointment; Apply topically 2 (two) times a day.  Dispense: 30 g; Refill: 2      Subjective:       Lorraine Harris is a 35 y.o. female who presents for a virtual visit.  She states that she has been having issues around her left eye pretty consistently now for the last 7 to 10 days.  She has had redness and swelling.  She is tried hot packs.  She states for as long as she could remember, she has had 2 small openings on the lid of her eye.  One would be where the normal tear duct is but she states she is always had a second 1.  This is where she is noticing some drainage and some cheesy material come from.  She states over the last year on and off she has had some issues with it but never this severe or lasting this long.  She did have an eye visit last January and mentioned it but the ophthalmologist did not seem concerned at that time.  She has not had a fever or felt systemically ill.  She also is having a rash on her hands.  This seems to come and go.  Is along the lateral aspects of her fingers and will start as little small blisters.    Relevant past medical, family, surgical, and social history reviewed with patient, unless noted in HPI, not pertinent for this visit.  Medications were discussed and reconciled.   Review of Systems   A 12 point comprehensive review of systems was negative except as noted.      Current Outpatient Medications   Medication Sig Dispense Refill     dextroamphetamine-amphetamine (ADDERALL XR) 15 MG 24 hr capsule Take 1 capsule (15 mg total) by mouth daily. 30 capsule 0     dextroamphetamine-amphetamine (ADDERALL XR) 15 MG 24 hr capsule Take 1 capsule (15 mg total) by mouth daily. 30 capsule 0      multivitamin with minerals (HAIR,SKIN AND NAILS ORAL) Take by mouth.       prenatal vitamin iron-folic acid 27mg-0.8mg (PRENATAL S) 27 mg iron- 800 mcg Tab tablet Take 1 tablet by mouth daily.       venlafaxine (EFFEXOR-XR) 75 MG 24 hr capsule Take 1 capsule (75 mg total) by mouth daily. 90 capsule 1     clindamycin (CLEOCIN) 300 MG capsule Take 1 capsule (300 mg total) by mouth 3 (three) times a day for 10 days. 30 capsule 0     triamcinolone (KENALOG) 0.1 % ointment Apply topically 2 (two) times a day. 30 g 2     No current facility-administered medications for this visit.        Objective:      There were no vitals taken for this visit.      General appearance: alert, appears stated age and cooperative  Eyes she sent me through some photos through my chart which actually showed the lesion much better.  She will some redness and a pretty large swelling just inferior to the medial aspect of the left eye.    Skin exam: Lateral aspects of several fingers show some small blistering and cracking.  No results found for this or any previous visit (from the past 168 hour(s)).       This note has been dictated using voice recognition software. Any grammatical or context distortions are unintentional and inherent to the software      Video-Visit Details    Type of service:  Video Visit    Video End Time (time video stopped): 11:35  Originating Location (pt. Location): Home    Distant Location (provider location):  [facility]/OB     Platform used for Video Visit: Mildred Armando MD

## 2021-06-11 NOTE — TELEPHONE ENCOUNTER
Controlled Substance Refill Request  Medication Name:   Requested Prescriptions     Pending Prescriptions Disp Refills     dextroamphetamine-amphetamine (ADDERALL XR) 15 MG 24 hr capsule 30 capsule 0     Sig: Take 1 capsule (15 mg total) by mouth daily.     Date Last Fill: 8/4/20  Requested Pharmacy: [facility]  Submit electronically to pharmacy  Controlled Substance Agreement on file:   Encounter-Level CSA Scan Date:    There are no encounter-level csa scan date.        Last office visit:  4/15/20

## 2021-06-11 NOTE — PROGRESS NOTES
Patient presents at the request of Dr. Jeannie Armando for bilateral upper extremity EMG.  She has bilateral hand numbness and tingling all digits of both hands right worse than left worsening over the past 6 months and significantly over the past month.  She is also had some numbness and tingling in her feet intermittently.  She has significant neck pain and complains of some arm heaviness/weakness .  On exam no atrophy of the muscles of the hand.    EMG/NCS  results: Please see scanned full report    Comment NCS: Normal study  1.  Normal nerve conduction studies bilateral upper extremities.    Comment EMG: Normal study  1.  Normal needle EMG bilateral upper extremities.    Interpretation: Normal study    1. There is no electrodiagnostic evidence of cervical radiculopathy, brachial plexopathy, or focal neuropathy in the bilateral upper extremities.  Specifically, there is no electrodiagnostic evidence of median neuropathy at the wrist in the bilateral upper extremities.    The testing was completed in its entirety by the physician.       It was our pleasure caring for your patient today, if there any questions or concerns please do not hesitate to contact us.

## 2021-06-11 NOTE — PROGRESS NOTES
Assessment/ Plan     1. Paresthesia and pain of both upper extremities  Patient has had several episodes of numbness in bilateral hands and feet of unclear etiology.  She was worried about MS, but had a normal MRI of her brain, C-spine, and L-spine last year.  I do think some of her hand symptoms are related to carpal tunnel.  It is also possible 1 of these episodes could have been a migraine variant.  Would recommend that we do an EMG of the right upper extremity as this is the most strongly affected.  Reassurance given she has a normal exam today.  Will check some blood work.  If both the EMG and blood work are unrevealing, then would recommend further evaluation by neurology.  Patient did want me to refill her ADHD medication.  She will stop it if she conceives, currently on Clomid therapy.  - Comprehensive Metabolic Panel  - Glycosylated Hemoglobin A1c  - HM2(CBC w/o Differential)  - Thyroid Stimulating Hormone (TSH)  - Vitamin D, Total (25-Hydroxy)  - Ferritin  - RPR  - Vitamin B12  - Ambulatory referral to [facility]  - EMG - Clinic  - EMG External Result      Subjective:       Lorraine Harris is a 32 y.o. female who presents for evaluation of numbness in her hands and feet.  Patient states that she had an episode a couple of weeks ago where she had some blurry vision and felt nauseated.  The she then had numbness and tingling in her hands and feet bilaterally.  She ended up calling in sick to work.  She states she just had a heavy feeling in her extremities and this lasted all day and then resolved on its own.  She did not have a headache with this.  Then she had another episode while at work where was both again bilateral hands and feet but not as severe.  She works at [employer] and was talking to 1 of the physicians there at the time.  It sounds like they did a brief neurologic exam and everything was normal at that time.  She is currently not having symptoms.  She does have a history of what I believe is some  "carpal tunnel and I actually tried to have her do an EMG a couple of years ago but she was afraid to do it.  I did do MRI of her brain, C-spine, and L-spine last year and those were completely normal other than some mild DJD.  There is no signs of MS or any other neurologic derangement.  She is also wondering about having refill her Adderall.  She states she has been off finished they have been trying to conceive.  She just feels like she has a really hard time focusing at home and at work.  She has been following with a psychiatrist for her depression and anxiety, but that person has left the practice.  She is wondering if we will refill her medications at this time.  She states things are fairly stable.  She is currently getting Clomid for infertility treatment.    The following portions of the patient's history were reviewed and updated as appropriate: allergies, current medications, past family history, past medical history, past social history, past surgical history and problem list.    Review of Systems   A 12 point comprehensive review of systems was negative except as noted.      Current Outpatient Prescriptions   Medication Sig Dispense Refill     DULoxetine (CYMBALTA) 30 MG capsule TK 1 C PO QD  2     multivitamin therapeutic (THERAGRAN) tablet Take 1 tablet by mouth daily.       ranitidine (ZANTAC) 75 MG tablet Take 75 mg by mouth daily.       venlafaxine (EFFEXOR-XR) 37.5 MG 24 hr capsule TK 1 C PO QD  2     dextroamphetamine-amphetamine (ADDERALL XR) 30 MG 24 hr capsule Take 1 capsule (30 mg total) by mouth 2 (two) times a day. 60 capsule 0     No current facility-administered medications for this visit.        Objective:      /66  Pulse 70  Temp 98.3  F (36.8  C) (Oral)   Resp 16  Ht 5' 3\" (1.6 m)  Wt 179 lb (81.2 kg)  BMI 31.71 kg/m2      General appearance: alert, appears stated age and cooperative  Head: Normocephalic, without obvious abnormality, atraumatic  Eyes: conjunctivae/corneas " clear. PERRL, EOM's intact. Fundi benign.  Ears: normal TM's and external ear canals both ears  Nose: Nares normal. Septum midline. Mucosa normal. No drainage or sinus tenderness.  Throat: lips, mucosa, and tongue normal; teeth and gums normal  Neck: no adenopathy, no carotid bruit, no JVD, supple, symmetrical, trachea midline and thyroid not enlarged, symmetric, no tenderness/mass/nodules  Lungs: clear to auscultation bilaterally  Heart: regular rate and rhythm, S1, S2 normal, no murmur, click, rub or gallop  Extremities: extremities normal, atraumatic, no cyanosis or edema  Pulses: 2+ and symmetric  Skin: Skin color, texture, turgor normal. No rashes or lesions  Lymph nodes: Cervical, supraclavicular, and axillary nodes normal.  Neurologic: Alert and oriented X 3, normal strength and tone. Normal symmetric reflexes. Normal coordination and gait.  Fine finger movement is normal.  Normal strength in bilateral hands, wrists, and arms.  Reflexes upper extremity are 2+ and symmetric.  Monofilament testing of hands is normal for sensation.  Positive Tinel and Phalen on the right.         Recent Results (from the past 168 hour(s))   Glycosylated Hemoglobin A1c   Result Value Ref Range    Hemoglobin A1c 5.5 3.5 - 6.0 %   HM2(CBC w/o Differential)   Result Value Ref Range    WBC 6.9 4.0 - 11.0 thou/uL    RBC 4.50 3.80 - 5.40 mill/uL    Hemoglobin 12.6 12.0 - 16.0 g/dL    Hematocrit 37.3 35.0 - 47.0 %    MCV 83 80 - 100 fL    MCH 27.9 27.0 - 34.0 pg    MCHC 33.7 32.0 - 36.0 g/dL    RDW 12.7 11.0 - 14.5 %    Platelets 542 (H) 140 - 440 thou/uL    MPV 6.4 (L) 7.0 - 10.0 fL          This note has been dictated using voice recognition software. Any grammatical or context distortions are unintentional and inherent to the software

## 2021-06-12 NOTE — PROGRESS NOTES
Assessment/ Plan     1. Right lateral epicondylitis  Lorraine has had a few month history of right forearm pain.  Her exam is consistent with lateral epicondylitis.  She will  a tennis elbow brace to wear for comfort.  Recommend icing 3 times daily.  I gave her handout with some stretching and exercising she can try on her own.  If she is not having any improvement, could set her up for formal physical therapy.    2. Acute pain of right shoulder  She has some mild rotator cuff tendinitis.  Again, discussed conservative therapy with icing and gentle stretching.    3. Pregnancy confirmed by positive urine test  She was hoping to start Depo-Provera today, but her pregnancy test came back positive much to her surprise.  She follows with [facility] and will set up an appointment with them soon as she has a history of ectopic pregnancy.  She should stop her Adderall and her omeprazole for now.  She could consider weaning down off the venlafaxine if she feels like she does not need it at this time.  - Pregnancy (Beta-hCG, Qual), Urine      Subjective:       Lorraine Harris is a 35 y.o. female who presents for discussion of right arm pain.  She states is been going on for a couple of months.  She thinks it started when she was on a vacation to [place] and she was in the pool and kept lifting up her daughter to toss her in the pool.  She has not been doing anything overuse.  It just does not seem to get better and it is getting worse.  She feels it over the elbow and sometimes it will radiate down into her wrist.  She had an EMG a few years ago as she had some carpal tunnel symptoms but this was normal.  She is also having some discomfort in her shoulder.  She has no known injury.  She has been doing a little bit of icing, but not consistently.  She wanted to restart her Depo.  She took this a long time ago when she was in college.  They currently just been using condoms.  After we got the positive pregnancy test back,  "she admits that the last time she had intercourse it was unprotected.  Her LMP is .  She has a really irregular cycle, sometimes 48 to 60 days.  She is excited about the pregnancy, but quite surprised.  She has a history of ectopic pregnancy and had a  with her last delivery.  She follows with [facility] in the past and recommend she see them.  She is not having any pelvic or abdominal pain.    Relevant past medical, family, surgical, and social history reviewed with patient, unless noted in HPI, not pertinent for this visit.  Medications were discussed and reconciled.   Review of Systems   A 12 point comprehensive review of systems was negative except as noted.      Current Outpatient Medications   Medication Sig Dispense Refill     dextroamphetamine-amphetamine (ADDERALL XR) 15 MG 24 hr capsule Take 1 capsule (15 mg total) by mouth daily. 30 capsule 0     loratadine (CLARITIN) 10 mg tablet Take 10 mg by mouth daily.       multivitamin with minerals (HAIR,SKIN AND NAILS ORAL) Take by mouth.       omeprazole (PRILOSEC) 20 MG capsule Take 20 mg by mouth daily before breakfast.       prenatal vitamin iron-folic acid 27mg-0.8mg (PRENATAL S) 27 mg iron- 800 mcg Tab tablet Take 1 tablet by mouth daily.       triamcinolone (KENALOG) 0.1 % ointment Apply topically 2 (two) times a day. 30 g 2     venlafaxine (EFFEXOR-XR) 75 MG 24 hr capsule Take 1 capsule (75 mg total) by mouth daily. 90 capsule 1     No current facility-administered medications for this visit.        Objective:      /77   Pulse 88   Temp 98.3  F (36.8  C) (Oral)   Resp 16   Ht 5' 3\" (1.6 m)   Wt 184 lb (83.5 kg)   BMI 32.59 kg/m        General appearance: alert, appears stated age and cooperative  Lungs: clear to auscultation bilaterally  Heart: regular rate and rhythm, S1, S2 normal, no murmur, click, rub or gallop  Extremities:    Right arm: Tender over the lateral epicondyles.  No pain of the elbow joint.  Good range " of motion.  Right shoulder: Good range of motion with some complaints of discomfort at extremes of forward flexion and abduction.  Negative drop test.    Recent Results (from the past 168 hour(s))   Pregnancy (Beta-hCG, Qual), Urine   Result Value Ref Range    Pregnancy Test, Urine Positive (!) Negative          This note has been dictated using voice recognition software. Any grammatical or context distortions are unintentional and inherent to the software

## 2021-06-12 NOTE — PROGRESS NOTES
Please let pt know EMG was normal, meaning no nerves are being pinched and there is not any neuropathy or injury to any nerves.  I am not sure why she is getting the numbness but the EMG is reassuring that there is not anything serious going on.

## 2021-06-12 NOTE — TELEPHONE ENCOUNTER
Controlled Substance Refill Request  Medication Name:   Requested Prescriptions     Pending Prescriptions Disp Refills     dextroamphetamine-amphetamine (ADDERALL XR) 15 MG 24 hr capsule 30 capsule 0     Sig: Take 1 capsule (15 mg total) by mouth daily.     Date Last Fill: 9/8/20  Requested Pharmacy: [facility]  Submit electronically to pharmacy  Controlled Substance Agreement on file:   Encounter-Level CSA Scan Date:    There are no encounter-level csa scan date.        Last office visit:  9/9/20

## 2021-06-13 NOTE — TELEPHONE ENCOUNTER
RN cannot approve Refill Request    RN can NOT refill this medication PCP messaged that patient is overdue for Labs. Last office visit: 10/22/2020 Jeannie Armando MD Last Physical: Visit date not found Last MTM visit: Visit date not found Last visit same specialty: 10/22/2020 Jeannie Armando MD.  Next visit within 3 mo: Visit date not found  Next physical within 3 mo: Visit date not found      [place] Triage/Med Refill 11/30/2020    Requested Prescriptions   Pending Prescriptions Disp Refills     venlafaxine (EFFEXOR-XR) 75 MG 24 hr capsule [Pharmacy Med Name: VENLAFAXINE ER 75MG CAPSULES] 90 capsule 1     Sig: TAKE ONE CAPSULE BY MOUTH DAILY       Venlafaxine/Desvenlafaxine Refill Protocol Failed - 11/30/2020  3:33 AM        Failed - LFT or AST or ALT in last year     Albumin   Date Value Ref Range Status   06/29/2017 3.7 3.5 - 5.0 g/dL Final     Bilirubin, Total   Date Value Ref Range Status   06/29/2017 0.3 0.0 - 1.0 mg/dL Final     Bilirubin, Direct   Date Value Ref Range Status   09/16/2013 0.1 <0.6 mg/dL Final     Alkaline Phosphatase   Date Value Ref Range Status   06/29/2017 78 45 - 120 U/L Final     AST   Date Value Ref Range Status   10/15/2018 113 (H) 0 - 40 U/L Final     ALT   Date Value Ref Range Status   10/15/2018 81 (H) 0 - 45 U/L Final     Protein, Total   Date Value Ref Range Status   06/29/2017 6.9 6.0 - 8.0 g/dL Final                Failed - Fasting lipid cascade in last year     No results found for: CHOL, TRIG, HDL, LDLCALC, FASTING          Passed - PCP or prescribing provider visit in last year     Last office visit with prescriber/PCP: 10/22/2020 Jeannie Armando MD OR same dept: 10/22/2020 Jeannie Armando MD OR same specialty: 10/22/2020 Jeannie Armando MD  Last physical: Visit date not found Last MTM visit: Visit date not found   Next visit within 3 mo: Visit date not found  Next physical within 3 mo: Visit date not found  Prescriber OR PCP: Jeannie Armando MD  Last  diagnosis associated with med order: 1. Mild episode of recurrent major depressive disorder (H)  - venlafaxine (EFFEXOR-XR) 75 MG 24 hr capsule [Pharmacy Med Name: VENLAFAXINE ER 75MG CAPSULES]; TAKE ONE CAPSULE BY MOUTH DAILY  Dispense: 90 capsule; Refill: 1    If protocol passes may refill for 12 months if within 3 months of last provider visit (or a total of 15 months).             Passed - Blood Pressure in last year     BP Readings from Last 1 Encounters:   10/24/20 112/55

## 2021-06-13 NOTE — PROGRESS NOTES
"  Chief Complaint   Patient presents with     Neck Pain     Pt cannot turn head x2 days     Back Pain     and shoulder pain     Numbness     in both feet x2 - 3 days         HPI:   Lorraine Harris is a 32 y.o. female has had severe neck pain for the last 2-3 days. Can't move neck. No known injury.  Has had some numbness in hands, but this is old.  Has been slightly more intense recently.  Has had trouble with gripping over a long period of time--not worse now.  Has had EMG of upper extremities in July that was was normal.  Using tylenol and vistaril. Ice.  No fever.    Also gets intermittent numbness in feet  That has been ongoing.      ROS:  A 12 point comprehensive review of systems was negative except as noted.     Medications:  Current Outpatient Prescriptions on File Prior to Visit   Medication Sig Dispense Refill     dextroamphetamine-amphetamine (ADDERALL XR) 30 MG 24 hr capsule Take 1 capsule (30 mg total) by mouth 2 (two) times a day. 60 capsule 0     DULoxetine (CYMBALTA) 30 MG capsule TK 1 C PO QD  2     multivitamin therapeutic (THERAGRAN) tablet Take 1 tablet by mouth daily.       venlafaxine (EFFEXOR-XR) 37.5 MG 24 hr capsule TK 1 C PO QD  2     ranitidine (ZANTAC) 75 MG tablet Take 75 mg by mouth daily.       No current facility-administered medications on file prior to visit.          Social History:  Social History   Substance Use Topics     Smoking status: Never Smoker     Smokeless tobacco: Not on file     Alcohol use No         Physical Exam:   Vitals:    09/27/17 1148   BP: 122/82   Patient Site: Right Arm   Patient Position: Sitting   Cuff Size: Adult Regular   Pulse: 88   Resp: 14   Temp: 98.1  F (36.7  C)   TempSrc: Oral   Weight: 174 lb (78.9 kg)   Height: 5' 3\" (1.6 m)       GENERAL:   Alert. Oriented. Holds neck stiff, without moving.  EYES: Clear  HENT:  Ears: R TM pearly gray. Normal landmarks. L TM pearly gray.  Normal landmarks  Nose: Clear.  Sinuses: Nontender.  Oropharynx:  No erythema. " No exudate.  NECK: Supple. No adenopathy.  LUNGS: Clear to ascultation.  No crackles.  No wheezing  HEART: RRR  SKIN:  No rash.  NECK:  No tenderness to percussion over spine.               No tenderness over paracervical muscles.                Passive FROM.  Active ROM limited in all directions due to pain  SHOULDER:  No tenderness to palpation  NEURO:  DTR's:  Triceps: 2/4  BL                                Bicips:  2/4  BL                               Brachioradialis:  2/4  BL                  MOTOR:  Finger Abduction/Adduction: 5/5  BL                                  Thumb Pincer:  5/5  BL                                   Wrist Flexion/Extension:  5/5  BL                                  Elbow Flexion/Extension:  5/5  BL                                  Shoulder Abduction/Adduction 5/5 BL                 Sensation intact to light touch BL          Assessment/Plan:    1. Neck pain  ibuprofen (ADVIL,MOTRIN) 600 MG tablet    cyclobenzaprine (FLEXERIL) 5 MG tablet   2. Paresthesia and pain of both upper extremities  Ambulatory referral to Neurology        Neck pain with probable neck spasm.  Medication as directed  Ice to neck/upper back 10-15 minutes at least 3 times a day.  Gentle stretching and ROM exercises.  F/U if no improvement or worsening.     Given referral to neurology for the ongoing numbness of hands/feet.    Note given to be off work 9/28/17.    The following portions of the patient's history were reviewed and updated as appropriate: allergies, current medications, past family history, past medical history, past social history, past surgical history and problem list.    Robin Bruno MD      9/27/2017

## 2021-06-15 ENCOUNTER — RECORDS - HEALTHEAST (OUTPATIENT)
Dept: ADMINISTRATIVE | Facility: OTHER | Age: 36
End: 2021-06-15

## 2021-06-15 ENCOUNTER — ANESTHESIA - HEALTHEAST (OUTPATIENT)
Dept: OBGYN | Facility: HOSPITAL | Age: 36
End: 2021-06-15

## 2021-06-15 ENCOUNTER — SURGERY - HEALTHEAST (OUTPATIENT)
Dept: OBGYN | Facility: HOSPITAL | Age: 36
End: 2021-06-15
Payer: COMMERCIAL

## 2021-06-15 PROBLEM — O00.90 ECTOPIC PREGNANCY: Status: ACTIVE | Noted: 2017-02-23

## 2021-06-15 ASSESSMENT — MIFFLIN-ST. JEOR: SCORE: 1593.54

## 2021-06-16 PROBLEM — M79.601 PARESTHESIA AND PAIN OF BOTH UPPER EXTREMITIES: Status: ACTIVE | Noted: 2017-06-29

## 2021-06-16 PROBLEM — R74.8 ELEVATED LIVER ENZYMES: Status: ACTIVE | Noted: 2018-10-16

## 2021-06-16 PROBLEM — Z98.891 STATUS POST CESAREAN DELIVERY: Status: ACTIVE | Noted: 2018-10-16

## 2021-06-16 PROBLEM — R20.2 PARESTHESIA AND PAIN OF BOTH UPPER EXTREMITIES: Status: ACTIVE | Noted: 2017-06-29

## 2021-06-16 PROBLEM — M79.602 PARESTHESIA AND PAIN OF BOTH UPPER EXTREMITIES: Status: ACTIVE | Noted: 2017-06-29

## 2021-06-16 NOTE — TELEPHONE ENCOUNTER
Telephone Encounter by Gabriela Ryan at 5/3/2019  8:46 AM     Author: Gabriela Ryan Service: -- Author Type: --    Filed: 5/3/2019  8:57 AM Encounter Date: 4/26/2019 Status: Signed    : Gabriela Ryan APPROVED:    Approval start date: 5/2/19  Approval end date: 5/2/21    Pharmacy has been notified of approval and will contact patient when medication is ready for pickup.

## 2021-06-17 NOTE — PROGRESS NOTES
Lorraine Harris is a 36 y.o. female who is being evaluated via a billable video visit.      How would you like to obtain your AVS? MyChart.  If dropped from the video visit, the video invitation should be resent by: Text to cell phone: [phone]  Will anyone else be joining your video visit? No    Video Start Time: 11:20    1. Infection due to 2019 novel coronavirus  Lorraine is now 16 days into a COVID-19 infection.  She has had ongoing shortness of breath.  She was seen in the emergency room last week and they felt she was safe to continue monitoring outpatient.  She states in some ways some of her symptoms are feeling better, but she still having waves of shortness of breath.  Her sats are running around 92-96 percentile.  She is at increased risk of PE and other complications due to her 30-week pregnancy.  On exam, she has a difficult time speaking in complete sentences.  She has been using an albuterol nebulizer and finished a course of dexamethasone.  I recommended that she be evaluated again if this episode of dyspnea does not resolve over the next several hours.    2. Dyspnea, unspecified type      Subjective   Lorraine Harris is 36 y.o. and presents today for the following health issues   HPI   COVID-19 infection.  Her  is on the virtual visit with her today to help provide the history as she is having a hard time talking in complete sentences without shortness of breath.  She states she had positive COVID-19 test on the 12th of this month.  She was seen in the emergency room on the 18th as she was having some shortness of breath.  They evaluated her and thought that she was safe to continue to monitor at home.  She is 30 weeks pregnant.  She is having good fetal movement.  She was discharged on dexamethasone and albuterol nebulizer.  She finished her course of dexamethasone.  She is not sure if the albuterol is really helping or not.  She has no prior history of asthma or wheezing.  She states that she is  eating better this week and overall her cough is improved.  She is not having a fever.  However, she continues to have waves of dyspnea.  She states when she lays on her right side she feels okay but when she lays on her left she will feel short of breath.  Sometimes sees waves of dyspnea last just a short period of time and other times they last hours.  She gets more short of breath with any activity and even with talking.  She does have a pulse oximeter.  She states the lowest he got was 89 and that was last week.  This week is been running anywhere from 92-96.  Her pulse has been running around 100.  She has been trying to drink fluids and does not think she is dehydrated at this point.  She does have some anxiety at baseline and feels like when she has a coughing episode, this will make her feel little bit more anxious.  She is not having an overwhelming sense of doom or increased anxiety.  She is never had a blood clot or a PE in the past.  She is taking a baby aspirin since January due to history of previous preeclampsia.  She is not having any calf pain or swelling.      Objective    Vitals - Patient Reported  SpO2 (Patient Reported): 95  Temperature (Patient Reported): 97  F (36.1  C)    Physical Exam  On virtual visit, she appears moderately ill.  She is visibly dyspneic with just talking with me today.  No observed cough.            Video-Visit Details    Type of service:  Video Visit    Video End Time (time video stopped): 11:40  Originating Location (pt. Location): Home    Distant Location (provider location):  [facility]     Platform used for Video Visit: Mildred

## 2021-06-17 NOTE — PROGRESS NOTES
Name: Lorraine Harris  : 1985   MRN: [id number]    ASSESSMENT & PLAN:   Lorraine Harris is a 36 y.o. female presenting today for work up cough and dyspnea s/p COVID in pregnancy.   .  1. 2019 novel coronavirus disease (COVID-19)  2. Shortness of breath  3. Cough  - XR Chest 2 Views  - CTA Chest PE Run; Future     Discussed case with her PCP. 3 weeks out from COVID diagnosis. Significant dyspnea, not hypoxic, concerned for PE.  D-dimer not helpful in pregnancy. CXR with COVID infiltrates, unable to obtain V/Q scan.   Will proceed with CTA PE run.    Return for pending CTA results .    Christina Ugalde [facility]          Lorraine Harris is a 36 y.o. female presenting to discuss the following:     CC:   Chief Complaint   Patient presents with     Cough       HPI:  Seeing Dr. Viveros at [facility] for pregnancy, on baby aspirin. 31 weeks tomorrow.  Diagnosed with COVID 3 weeks ago, symptoms were improving, still feeling very short of breath.   Coughing and shortness of breath, dyspnea with exertion present. Last week still febrile, afebrile this week.     ROS:   See HPI above.     PMH:   Patient Active Problem List   Diagnosis     Esophageal Reflux     Eczema     Attention Deficit Disorder Without Hyperactivity     Thrombocytosis     Chronic Major Depression     Depression With Anxiety     Keratosis Pilaris     Palpitations     Ectopic pregnancy     Paresthesia and pain of both upper extremities     Elevated liver enzymes     Status post  delivery       Past Medical History:   Diagnosis Date     Anxiety      Depression      GERD (gastroesophageal reflux disease)      Gestational Proteinuria     Created by Conversion      Preeclampsia     Created by Conversion      Pregnancy     Created by Conversion        PSH:   Past Surgical History:   Procedure Laterality Date     ARTHROSCOPIC REPAIR ACL      and lateral release      SECTION N/A 10/16/2018    Procedure: PRIMARY  " SECTION;  Surgeon: Tucker Llamas MD;  Location: [facility] OR;  Service:      MI LAP,DIAGNOSTIC ABDOMEN N/A 2017    Procedure: LAPAROSCOPY, LEFT SALPINGECTOMY;  Surgeon: Tucekr Llamas MD;  Location: [facility] OR;  Service: Gynecology     MI REMOVAL OF TONSILS,<13 Y/O      Description: Tonsillectomy;  Proc Date: 2008;  Comments: w/ adenoidectomy     salpingectomy Left 2017    ectopic pregnancy     WISDOM TOOTH EXTRACTION           MEDICATIONS:   Current Outpatient Medications on File Prior to Visit   Medication Sig Dispense Refill     albuterol (PROVENTIL) 2.5 mg /3 mL (0.083 %) nebulizer solution INHALE 3ML VIA NEBULIZER TWICE DAILY       aspirin 81 mg chewable tablet Baby Aspirin 81 mg chewable tablet   Chew 1 tablet every day by oral route.       hydrOXYzine pamoate (VISTARIL) 50 MG capsule TAKE 1 CAPSULE BY MOUTH EVERY 8 HOURS AS NEEDED FOR ANXIETY OR SLEEP       loratadine (CLARITIN) 10 mg tablet Take 10 mg by mouth daily.       omeprazole (PRILOSEC) 20 MG capsule Take 20 mg by mouth daily before breakfast.       prenatal vitamin iron-folic acid 27mg-0.8mg (PRENATAL S) 27 mg iron- 800 mcg Tab tablet Take 1 tablet by mouth daily.       sertraline (ZOLOFT) 25 MG tablet        HYDROcodone-acetaminophen 5-325 mg per tablet Take 1 tablet by mouth every 4 (four) hours as needed for pain. 10 tablet 0     No current facility-administered medications on file prior to visit.        ALLERGIES:  No Known Allergies        PHYSICAL EXAM:   /76   Pulse (!) 105   Temp 97.4  F (36.3  C) (Oral)   Ht 5' 3\" (1.6 m)   Wt 198 lb 2 oz (89.9 kg)   SpO2 96%   BMI 35.10 kg/m     GENERAL: Lorraine is a pleasant, gravid female, tachypneic, unable to speak in full sentences.   HEART: Regular rate and rhythm, no murmurs.   LUNGS: Tachypneic, no crackles or wheezes.   EXTREMITIES: Pulses intact, no calf tenderness, no redness, no warmth.       IMAGING:   EXAM: XR CHEST 2 " VIEWS  LOCATION: [facility]  DATE/TIME: 4/30/2021 2:40 PM     INDICATION: hx COVID, significant cough, shortness of breath, dyspnea with exertion.   COMPARISON: None.     IMPRESSION:   Mild asymmetric elevation of the right hemidiaphragm. Mild patchy bilateral multilobar peripheral airspace opacities consistent with the history of COVID 19 pneumonia. No pleural effusion. Normal heart size.

## 2021-06-17 NOTE — PROGRESS NOTES
Called patient to discuss findings.   Need to proceed with CTA PE run to rule out pulmonary embolism.   Staff will help coordinate STAT CTA. Orders placed.  Christina Ugalde, DO

## 2021-06-17 NOTE — PROGRESS NOTES
Called Lorraine to check in on breathing s/p CTA.   She is feeling a little bit better. Will continue to monitor.  She did note improvement with the hydroxyzine, refill sent to pharmacy.  Christina Ugalde, DO

## 2021-06-17 NOTE — TELEPHONE ENCOUNTER
Daughter, [address] called stating missed call from hospital. Upon review of Dr. Kathy Guillen note, MD attempted to call her. Radha sent to MD to notify of daughter's availability to speak on the phone. Reason for call:  Patient reporting a symptom    Symptom or request: Patient's , Kenneth called wanting to check with Dr. Armando if there's anything patient can take or do for shortness of breath. Patient was positive for covid 04/12 - cough is better, but patient is having shortness of breath just walking, going to the restroom 10 ft away. Kenneth would like a call back.    Duration (how long have symptoms been present): about 2 wks now    Have you been treated for this before? Yes    Additional comments: NA    Phone Number patient can be reached at:  Other phone number:  [phone]    Best Time:  Any time    Can we leave a detailed message on this number: Yes    Call taken on 4/28/2021 at 10:21 AM by Argelia Castano

## 2021-06-21 NOTE — ANESTHESIA CARE TRANSFER NOTE
Last vitals:   Vitals:    10/16/18 1858   BP: 119/66   Pulse: (!) 54   Resp: 16   Temp: 36.6  C (97.9  F)   SpO2: 98%     Patient's level of consciousness is awake  Spontaneous respirations: yes  Maintains airway independently: yes  Dentition unchanged: yes  Oropharynx: oropharynx clear of all foreign objects    QCDR Measures:  ASA# 20 - Surgical Safety Checklist: WHO surgical safety checklist completed prior to induction  PQRS# 430 - Adult PONV Prevention: 4558F - Pt received => 2 anti-emetic agents (different classes) preop & intraop  ASA# 8 - Peds PONV Prevention: NA - Not pediatric patient, not GA or 2 or more risk factors NOT present  PQRS# 424 - Monae-op Temp Management: 4559F - At least one body temp DOCUMENTED => 35.5C or 95.9F within required timeframe  PQRS# 426 - PACU Transfer Protocol: - Transfer of care checklist used  ASA# 14 - Acute Post-op Pain: ASA14B - Patient did NOT experience pain >= 7 out of 10

## 2021-06-21 NOTE — ANESTHESIA PREPROCEDURE EVALUATION
Anesthesia Evaluation      Patient summary reviewed     Airway   Mallampati: II  Neck ROM: full   Pulmonary - normal exam    breath sounds clear to auscultation                         Cardiovascular   Rhythm: regular  Rate: normal,         Neuro/Psych    (+) depression,     Endo/Other - negative ROS      GI/Hepatic/Renal       Other findings: Palpatations, thrombocytosis--stable      Dental - normal exam                        Anesthesia Plan  Planned anesthetic: spinal  Duramorph/tap  Block if requested  ASA 2     Anesthetic plan and risks discussed with: patient    Post-op plan: routine recovery

## 2021-06-21 NOTE — PROGRESS NOTES
Patient was not contacted for hospital discharge f/u as pt was seen in [facility] for elevated BP in clinic.  Pt was d/cd same day with f/u w/OB/GYN.  No call made.     Isabel Cheung RN Care Manager, Population Health

## 2021-06-21 NOTE — ANESTHESIA PROCEDURE NOTES
Spinal Block    Patient location during procedure: OR  Start time: 10/16/2018 5:38 PM  End time: 10/16/2018 5:42 PM  Reason for block: primary anesthetic    Staffing:  Performing  Anesthesiologist: MOHAMUD BRIONES    Preanesthetic Checklist  Completed: patient identified, risks, benefits, and alternatives discussed, timeout performed, consent obtained, at patient's request, airway assessed, oxygen available, suction available, emergency drugs available and hand hygiene performed  Spinal Block  Patient position: sitting  Prep: ChloraPrep  Patient monitoring: heart rate, cardiac monitor, continuous pulse ox and blood pressure  Approach: right paramedian  Location: L4-5  Injection technique: single-shot  Needle type: pencil-tip   Needle gauge: 24 G    Assessment  Sensory level: T10    Additional Notes:  katelin mcelroy

## 2021-06-21 NOTE — PROGRESS NOTES
[facility] hospital discharge call not made for this OB pt. OB at hospital will make f/u call.

## 2021-06-21 NOTE — ANESTHESIA POSTPROCEDURE EVALUATION
Patient: Lorraine Harris  PRIMARY  SECTION  Anesthesia type: spinal    Patient location: Labor and Delivery  Last vitals:   Vitals:    10/17/18 0600   BP:    Pulse:    Resp: 16   Temp:    SpO2: 98%     Post vital signs: stable  Level of consciousness: awake and responds to simple questions  Post-anesthesia pain: pain controlled  Post-anesthesia nausea and vomiting: no  Pulmonary: unassisted, return to baseline  Cardiovascular: stable and blood pressure at baseline  Hydration: adequate  Anesthetic events: no    QCDR Measures:  ASA# 11 - Monae-op Cardiac Arrest: ASA11B - Patient did NOT experience unanticipated cardiac arrest  ASA# 12 - Monae-op Mortality Rate: ASA12B - Patient did NOT die  ASA# 13 - PACU Re-Intubation Rate: NA - No ETT / LMA used for case  ASA# 10 - Composite Anes Safety: ASA10A - No serious adverse event    Additional Notes:

## 2021-06-21 NOTE — ANESTHESIA PROCEDURE NOTES
Peripheral Block    Patient location during procedure: OR  Start time: 10/16/2018 6:37 PM  End time: 10/16/2018 6:41 PM  post-op analgesia per surgeon order as noted in medical record  Staffing:  Performing  Anesthesiologist: MOHAMUD BRIONES  Performing CRNA: ROMAIN RICH  Preanesthetic Checklist  Completed: patient identified, site marked, risks, benefits, and alternatives discussed, timeout performed, consent obtained, at patient's request, airway assessed, oxygen available, suction available, emergency drugs available and hand hygiene performed  Peripheral Block  Block type: other, TAP  Prep: ChloraPrep  Patient position: supine  Patient monitoring: cardiac monitor, continuous pulse oximetry, heart rate and blood pressure  Laterality: bilateral  Injection technique: ultrasound guided    Ultrasound used to visualize needle placement in proximity to nerve being blocked: yes   Permanent ultrasound image captured for medical record  Sterile gel and probe cover used for ultrasound.    Needle  Needle type: Stimuplex   Needle gauge: 22 G  Needle length: 4 in  no peripheral nerve catheter placed  Assessment  Injection assessment: no difficulty with injection, negative aspiration for heme, no paresthesia on injection and incremental injection  Additional Notes  katelin well

## 2021-06-25 NOTE — PROGRESS NOTES
"Assessment/ Plan     1. Sore throat  Patient's rapid strep is negative.  Backup culture is sent and she will be notified only if positive.  - Rapid Strep A Screen- Throat Swab  - Influenza A/B Rapid Test- Nasal Swab  - Group A Strep, RNA Direct Detection, Throat    2. Viral illness  Patient's rapid influenza was negative.  Discussed her symptoms are consistent with a flu like illness and we see false negatives.  She is outside the treatment window, it is academic at this point.  Would have her continue to avoid being around her young children.  Treat the fever with ibuprofen and/or Tylenol.  She will continue to push fluids.  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  Would want to see her back early next week if fever remains.      Subjective:       Lorraine Harris is a 34 y.o. female who presents for evaluation of sore throat and fever.  She states that symptoms started about 4 days ago.  She states the first couple of days she just had a sore throat.  Then 2 days ago she states she \"felt like she was hit by bus\".  She started to feel really achy and had fatigue.  She is been sleeping a lot.  Her fever was 101.5 yesterday.  Her last fever was at 2:00 this morning.  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  She did get her flu shot this year.  She is otherwise healthy without major medical problems.  Her main concern is that she has a 5-month-old son at home.  She is not breast-feeding and has been keeping herself away from him.  He so far has not shown any signs of illness.  She is keeping well hydrated.  She has not had any vomiting or diarrhea.  She has had just a mild cough.    Relevant past medical, family, surgical, and social history reviewed with patient, unless noted in HPI, not pertinent for this visit.    Review of Systems   A 12 point comprehensive review of systems was negative except as noted.      Current Outpatient " "Medications   Medication Sig Dispense Refill     acetaminophen (TYLENOL) 500 MG tablet Take 1,000 mg by mouth every 6 (six) hours as needed for pain.       cholecalciferol, vitamin D3, 1,000 unit tablet Take 2,000 Units by mouth daily.       multivitamin therapeutic (THERAGRAN) tablet Take 1 tablet by mouth daily.       prenatal vitamin iron-folic acid 27mg-0.8mg (PRENATAL S) 27 mg iron- 800 mcg Tab tablet Take 1 tablet by mouth daily.       No current facility-administered medications for this visit.        Objective:      /62   Pulse 68   Temp 98.6  F (37  C) (Oral)   Resp 14   Ht 5' 3\" (1.6 m)   Wt 170 lb (77.1 kg)   BMI 30.11 kg/m        General appearance: alert, appears stated age and cooperative  Head: Normocephalic, without obvious abnormality, atraumatic  Eyes: conjunctivae/corneas clear.   Ears: normal TM's and external ear canals both ears  Nose: Nares normal. Septum midline. Mucosa normal. No drainage or sinus tenderness.  Throat: lips, mucosa, and tongue normal; teeth and gums normal  Neck: no adenopathy.  Lungs: clear to auscultation bilaterally  Heart: regular rate and rhythm, S1, S2 normal, no murmur, click, rub or gallop      Recent Results (from the past 168 hour(s))   Rapid Strep A Screen- Throat Swab   Result Value Ref Range    Rapid Strep A Antigen No Group A Strep detected, presumptive negative No Group A Strep detected, presumptive negative   Influenza A/B Rapid Test- Nasal Swab   Result Value Ref Range    Influenza  A, Rapid Antigen No Influenza A antigen detected No Influenza A antigen detected    Influenza B, Rapid Antigen No Influenza B antigen detected No Influenza B antigen detected          This note has been dictated using voice recognition software. Any grammatical or context distortions are unintentional and inherent to the software  "

## 2021-06-26 NOTE — ANESTHESIA PROCEDURE NOTES
Peripheral Block    Patient location during procedure: OR  Start time: 6/15/2021 6:20 AM  End time: 6/15/2021 6:30 AM  post-op analgesia per surgeon order as noted in medical record  Staffing:  Performing  Anesthesiologist: Alex Rhodes MD  Preanesthetic Checklist  Completed: patient identified, site marked, risks, benefits, and alternatives discussed, timeout performed, consent obtained, airway assessed, oxygen available, suction available, emergency drugs available and hand hygiene performed  Peripheral Block  Block type: other, TAP  Prep: ChloraPrep  Patient position: supine  Patient monitoring: cardiac monitor, continuous pulse oximetry, heart rate and blood pressure  Laterality: bilateral  Injection technique: ultrasound guided    Ultrasound used to visualize needle placement in proximity to nerve being blocked: yes   US used to visualize anesthetic spread  Visualized anatomic structures normal  No Pathological Findings  Permanent ultrasound image captured for medical record  Sterile gel and probe cover used for ultrasound.  Needle  Needle type: Stimuplex   Needle gauge: 20G  Needle length: 6 in    Assessment  Injection assessment: no difficulty with injection, negative aspiration for heme, no paresthesia on injection and incremental injection

## 2021-06-26 NOTE — ANESTHESIA POSTPROCEDURE EVALUATION
Patient: Lorraine Harris  Procedure(s):   SECTION  Anesthesia type: spinal    Patient location: PACU  Last vitals:   Vitals Value Taken Time   /65 06/15/21 1033   Temp  06/15/21 1100   Pulse 62 06/15/21 1057   Resp 16 06/15/21 0931   SpO2 100 % 06/15/21 1057   Vitals shown include unvalidated device data.  Post vital signs: stable  Level of consciousness: awake and responds to simple questions  Post-anesthesia pain: pain controlled  Post-anesthesia nausea and vomiting: no  Pulmonary: unassisted, return to baseline  Cardiovascular: stable and blood pressure at baseline  Hydration: adequate  Anesthetic events: no    QCDR Measures:  ASA# 11 - Monae-op Cardiac Arrest: ASA11B - Patient did NOT experience unanticipated cardiac arrest  ASA# 12 - Monae-op Mortality Rate: ASA12B - Patient did NOT die  ASA# 13 - PACU Re-Intubation Rate: ASA13B - Patient did NOT require a new airway mgmt  ASA# 10 - Composite Anes Safety: ASA10A - No serious adverse event    Additional Notes:

## 2021-06-26 NOTE — ANESTHESIA CARE TRANSFER NOTE
Last vitals:   Vitals:    06/15/21 0645   BP: 130/75   Pulse: (!) 56   Resp: 16   Temp:    SpO2: 100%     Patient's level of consciousness is awake  Spontaneous respirations: yes  Maintains airway independently: yes  Dentition unchanged: yes  Oropharynx: oropharynx clear of all foreign objects    QCDR Measures:  ASA# 20 - Surgical Safety Checklist: WHO surgical safety checklist completed prior to induction    PQRS# 430 - Adult PONV Prevention: 4558F - Pt received => 2 anti-emetic agents (different classes) preop & intraop  ASA# 8 - Peds PONV Prevention: NA - Not pediatric patient, not GA or 2 or more risk factors NOT present  PQRS# 424 - Monae-op Temp Management: 4559F - At least one body temp DOCUMENTED => 35.5C or 95.9F within required timeframe  PQRS# 426 - PACU Transfer Protocol:NA - Patient did not go to PACU  ASA# 14 - Acute Post-op Pain: ASA14B - Patient did NOT experience pain >= 7 out of 10

## 2021-06-26 NOTE — ANESTHESIA PROCEDURE NOTES
Spinal Block    Patient location during procedure: OR  Start time: 6/15/2021 5:29 AM  End time: 6/15/2021 5:32 AM  Reason for block: primary anesthetic    Staffing:  Performing  Anesthesiologist: Cam Zee MD    Preanesthetic Checklist  Completed: patient identified, risks, benefits, and alternatives discussed, timeout performed, consent obtained, airway assessed, oxygen available, suction available, emergency drugs available and hand hygiene performed  Spinal Block  Patient position: sitting  Prep: ChloraPrep and site prepped and draped  Patient monitoring: heart rate, cardiac monitor, continuous pulse ox and blood pressure  Approach: midline  Location: L3-4  Injection technique: single-shot  Needle type: pencil-tip   Needle gauge: 24 G

## 2021-06-26 NOTE — ANESTHESIA PREPROCEDURE EVALUATION
Anesthesia Evaluation      Patient summary reviewed     Airway   Mallampati: I  Neck ROM: full   Pulmonary - normal exam    breath sounds clear to auscultation  (+) recent URI resolved,                          Cardiovascular - normal exam  (+) hypertension well controlled, ,     Rhythm: regular  Rate: normal,         Neuro/Psych    (+) depression, anxiety/panic attacks,     Endo/Other - negative ROS      GI/Hepatic/Renal    (+) GERD well controlled,             Dental - normal exam                        Anesthesia Plan  Planned anesthetic: spinal and peripheral nerve block    ASA 2 - emergent     Anesthetic plan and risks discussed with: patient and spouse  Anesthesia plan special considerations: antiemetics,   Post-op plan: routine recovery

## 2021-06-28 ENCOUNTER — SURGERY - HEALTHEAST (OUTPATIENT)
Dept: OBGYN | Facility: CLINIC | Age: 36
End: 2021-06-28
Payer: COMMERCIAL

## 2021-07-03 NOTE — ADDENDUM NOTE
Addendum Note by Jeannie Curry MD at 10/28/2019 10:24 AM     Author: Jeannie Curry MD Service: -- Author Type: Physician    Filed: 10/28/2019 10:24 AM Encounter Date: 10/28/2019 Status: Signed    : Jeannie Curry MD (Physician)    Addended by: JEANNIE CURRY on: 10/28/2019 10:24 AM        Modules accepted: Orders

## 2021-07-05 PROBLEM — O09.529 AMA (ADVANCED MATERNAL AGE) MULTIGRAVIDA 35+: Status: ACTIVE | Noted: 2021-06-17

## 2021-07-05 PROBLEM — M79.601 PARESTHESIA AND PAIN OF BOTH UPPER EXTREMITIES: Status: RESOLVED | Noted: 2017-06-29 | Resolved: 2021-06-17

## 2021-07-05 PROBLEM — R20.2 PARESTHESIA AND PAIN OF BOTH UPPER EXTREMITIES: Status: RESOLVED | Noted: 2017-06-29 | Resolved: 2021-06-17

## 2021-07-05 PROBLEM — M79.602 PARESTHESIA AND PAIN OF BOTH UPPER EXTREMITIES: Status: RESOLVED | Noted: 2017-06-29 | Resolved: 2021-06-17

## 2021-07-05 PROBLEM — O00.90 ECTOPIC PREGNANCY: Status: RESOLVED | Noted: 2017-02-23 | Resolved: 2021-06-17

## 2021-07-06 VITALS — BODY MASS INDEX: 36.49 KG/M2 | WEIGHT: 206 LBS | HEIGHT: 63 IN | BODY MASS INDEX: 36.49 KG/M2

## 2021-07-14 PROBLEM — O00.90 ECTOPIC PREGNANCY: Status: RESOLVED | Noted: 2017-02-23 | Resolved: 2021-06-17

## 2021-08-12 ENCOUNTER — IMMUNIZATION (OUTPATIENT)
Dept: NURSING | Facility: CLINIC | Age: 36
End: 2021-08-12
Payer: COMMERCIAL

## 2021-08-12 PROCEDURE — 0001A PR COVID VAC PFIZER DIL RECON 30 MCG/0.3 ML IM: CPT

## 2021-08-12 PROCEDURE — 91300 PR COVID VAC PFIZER DIL RECON 30 MCG/0.3 ML IM: CPT

## 2021-08-15 ENCOUNTER — HEALTH MAINTENANCE LETTER (OUTPATIENT)
Age: 36
End: 2021-08-15

## 2021-09-02 ENCOUNTER — IMMUNIZATION (OUTPATIENT)
Dept: NURSING | Facility: CLINIC | Age: 36
End: 2021-09-02
Attending: PEDIATRICS
Payer: COMMERCIAL

## 2021-09-02 PROCEDURE — 0002A PR COVID VAC PFIZER DIL RECON 30 MCG/0.3 ML IM: CPT

## 2021-09-02 PROCEDURE — 91300 PR COVID VAC PFIZER DIL RECON 30 MCG/0.3 ML IM: CPT

## 2021-09-08 ENCOUNTER — MEDICAL CORRESPONDENCE (OUTPATIENT)
Dept: HEALTH INFORMATION MANAGEMENT | Facility: CLINIC | Age: 36
End: 2021-09-08

## 2021-09-09 DIAGNOSIS — F41.9 ANXIETY: Primary | ICD-10-CM

## 2021-09-09 RX ORDER — SERTRALINE HYDROCHLORIDE 25 MG/1
25 TABLET, FILM COATED ORAL DAILY
Qty: 90 TABLET | Refills: 3 | Status: SHIPPED | OUTPATIENT
Start: 2021-09-09 | End: 2022-04-20

## 2021-09-09 NOTE — TELEPHONE ENCOUNTER
Reason for Call:  Medication or medication refill:    Do you use a [facility]?  Name of the pharmacy and phone number for the current request:  [id number] - [place] [address] [place] [address] [address]  [phone]    Name of the medication requested: sertraline (ZOLOFT) 25 MG tablet    Other request: N/A    Can we leave a detailed message on this number? YES    Phone number patient can be reached at: Cell number on file:    Telephone Information:   Mobile [phone]       Best Time: Any time    Call taken on 9/9/2021 at 11:38 AM by Argelia Castano

## 2021-10-11 ENCOUNTER — HEALTH MAINTENANCE LETTER (OUTPATIENT)
Age: 36
End: 2021-10-11

## 2021-11-30 ENCOUNTER — MEDICAL CORRESPONDENCE (OUTPATIENT)
Dept: HEALTH INFORMATION MANAGEMENT | Facility: CLINIC | Age: 36
End: 2021-11-30
Payer: COMMERCIAL

## 2021-12-31 ENCOUNTER — LAB REQUISITION (OUTPATIENT)
Dept: LAB | Facility: CLINIC | Age: 36
End: 2021-12-31

## 2021-12-31 ENCOUNTER — APPOINTMENT (OUTPATIENT)
Dept: FAMILY MEDICINE | Facility: CLINIC | Age: 36
End: 2021-12-31
Payer: COMMERCIAL

## 2021-12-31 PROCEDURE — U0003 INFECTIOUS AGENT DETECTION BY NUCLEIC ACID (DNA OR RNA); SEVERE ACUTE RESPIRATORY SYNDROME CORONAVIRUS 2 (SARS-COV-2) (CORONAVIRUS DISEASE [COVID-19]), AMPLIFIED PROBE TECHNIQUE, MAKING USE OF HIGH THROUGHPUT TECHNOLOGIES AS DESCRIBED BY CMS-2020-01-R: HCPCS | Performed by: INTERNAL MEDICINE

## 2022-01-01 LAB — SARS-COV-2 RNA RESP QL NAA+PROBE: NEGATIVE

## 2022-01-05 ENCOUNTER — LAB REQUISITION (OUTPATIENT)
Dept: LAB | Facility: CLINIC | Age: 37
End: 2022-01-05

## 2022-01-05 ENCOUNTER — APPOINTMENT (OUTPATIENT)
Dept: FAMILY MEDICINE | Facility: CLINIC | Age: 37
End: 2022-01-05
Payer: COMMERCIAL

## 2022-01-05 PROCEDURE — U0003 INFECTIOUS AGENT DETECTION BY NUCLEIC ACID (DNA OR RNA); SEVERE ACUTE RESPIRATORY SYNDROME CORONAVIRUS 2 (SARS-COV-2) (CORONAVIRUS DISEASE [COVID-19]), AMPLIFIED PROBE TECHNIQUE, MAKING USE OF HIGH THROUGHPUT TECHNOLOGIES AS DESCRIBED BY CMS-2020-01-R: HCPCS | Performed by: INTERNAL MEDICINE

## 2022-01-06 LAB — SARS-COV-2 RNA RESP QL NAA+PROBE: NEGATIVE

## 2022-01-12 VITALS — BODY MASS INDEX: 36.5 KG/M2 | HEIGHT: 63 IN | WEIGHT: 206 LBS

## 2022-01-18 VITALS
HEIGHT: 63 IN | BODY MASS INDEX: 35.11 KG/M2 | WEIGHT: 198.13 LBS | OXYGEN SATURATION: 96 % | SYSTOLIC BLOOD PRESSURE: 120 MMHG | HEART RATE: 105 BPM | DIASTOLIC BLOOD PRESSURE: 76 MMHG | TEMPERATURE: 97.4 F

## 2022-01-18 VITALS
HEIGHT: 63 IN | BODY MASS INDEX: 32.6 KG/M2 | SYSTOLIC BLOOD PRESSURE: 115 MMHG | WEIGHT: 184 LBS | TEMPERATURE: 98.3 F | HEART RATE: 88 BPM | RESPIRATION RATE: 16 BRPM | DIASTOLIC BLOOD PRESSURE: 77 MMHG

## 2022-01-18 ASSESSMENT — PATIENT HEALTH QUESTIONNAIRE - PHQ9
SUM OF ALL RESPONSES TO PHQ QUESTIONS 1-9: 0
SUM OF ALL RESPONSES TO PHQ QUESTIONS 1-9: 0

## 2022-01-30 ENCOUNTER — HEALTH MAINTENANCE LETTER (OUTPATIENT)
Age: 37
End: 2022-01-30

## 2022-02-04 ENCOUNTER — IMMUNIZATION (OUTPATIENT)
Dept: NURSING | Facility: CLINIC | Age: 37
End: 2022-02-04
Payer: COMMERCIAL

## 2022-02-04 PROCEDURE — 0054A COVID-19,PF,PFIZER (12+ YRS): CPT

## 2022-02-04 PROCEDURE — 91305 COVID-19,PF,PFIZER (12+ YRS): CPT

## 2022-02-18 ENCOUNTER — E-VISIT (OUTPATIENT)
Dept: FAMILY MEDICINE | Facility: CLINIC | Age: 37
End: 2022-02-18
Payer: COMMERCIAL

## 2022-02-18 DIAGNOSIS — N61.0 MASTITIS: Primary | ICD-10-CM

## 2022-02-18 PROCEDURE — 99421 OL DIG E/M SVC 5-10 MIN: CPT | Performed by: FAMILY MEDICINE

## 2022-02-18 RX ORDER — SULFAMETHOXAZOLE/TRIMETHOPRIM 800-160 MG
1 TABLET ORAL 2 TIMES DAILY
Qty: 14 TABLET | Refills: 0 | Status: SHIPPED | OUTPATIENT
Start: 2022-02-18 | End: 2022-02-25

## 2022-02-18 NOTE — PATIENT INSTRUCTIONS
Thank you for choosing us for your care. I have placed an order for a prescription so that you can start treatment. View your full visit summary for details by clicking on the link below. Your pharmacist will able to address any questions you may have about the medication.     If you're not feeling better within 5-7 days, please schedule an appointment.  You can schedule an appointment right here in [facility], or call [phone]  If the visit is for the same symptoms as your eVisit, we'll refund the cost of your eVisit if seen within seven days.

## 2022-04-12 ENCOUNTER — NURSE TRIAGE (OUTPATIENT)
Dept: NURSING | Facility: CLINIC | Age: 37
End: 2022-04-12
Payer: COMMERCIAL

## 2022-04-12 NOTE — TELEPHONE ENCOUNTER
As I have not seen her in about a year, would recommend she come in for an office visit.  We will have her sign a new controlled substance agreement and do her urinalysis per protocol.

## 2022-04-12 NOTE — TELEPHONE ENCOUNTER
Patient calling.  Wants to restart her adderall.  Done breast feeding. Can she get a refill sent to her pharmacy if it is ok to restart. She is not sure if she needs to be seen to restart.    Patient asking for call back    Pharmacy is [facility] off [address]    Lydia Quiles RN  [facility] Nurse Advisor

## 2022-04-20 ENCOUNTER — OFFICE VISIT (OUTPATIENT)
Dept: FAMILY MEDICINE | Facility: CLINIC | Age: 37
End: 2022-04-20
Payer: COMMERCIAL

## 2022-04-20 VITALS
BODY MASS INDEX: 32.92 KG/M2 | RESPIRATION RATE: 16 BRPM | HEIGHT: 63 IN | SYSTOLIC BLOOD PRESSURE: 137 MMHG | DIASTOLIC BLOOD PRESSURE: 66 MMHG | HEART RATE: 64 BPM | WEIGHT: 185.8 LBS

## 2022-04-20 DIAGNOSIS — Z30.42 DEPO-PROVERA CONTRACEPTIVE STATUS: ICD-10-CM

## 2022-04-20 DIAGNOSIS — F41.1 GENERALIZED ANXIETY DISORDER: ICD-10-CM

## 2022-04-20 DIAGNOSIS — F90.2 ATTENTION DEFICIT HYPERACTIVITY DISORDER (ADHD), COMBINED TYPE: Primary | ICD-10-CM

## 2022-04-20 PROCEDURE — 99214 OFFICE O/P EST MOD 30 MIN: CPT | Mod: 25 | Performed by: FAMILY MEDICINE

## 2022-04-20 PROCEDURE — 96372 THER/PROPH/DIAG INJ SC/IM: CPT | Performed by: FAMILY MEDICINE

## 2022-04-20 RX ORDER — DEXTROAMPHETAMINE SACCHARATE, AMPHETAMINE ASPARTATE MONOHYDRATE, DEXTROAMPHETAMINE SULFATE AND AMPHETAMINE SULFATE 7.5; 7.5; 7.5; 7.5 MG/1; MG/1; MG/1; MG/1
30 CAPSULE, EXTENDED RELEASE ORAL DAILY
Qty: 30 CAPSULE | Refills: 0 | Status: SHIPPED | OUTPATIENT
Start: 2022-04-20 | End: 2022-05-27

## 2022-04-20 RX ORDER — DEXTROAMPHETAMINE SACCHARATE, AMPHETAMINE ASPARTATE MONOHYDRATE, DEXTROAMPHETAMINE SULFATE AND AMPHETAMINE SULFATE 7.5; 7.5; 7.5; 7.5 MG/1; MG/1; MG/1; MG/1
30 CAPSULE, EXTENDED RELEASE ORAL DAILY
Qty: 30 CAPSULE | Refills: 0 | Status: SHIPPED | OUTPATIENT
Start: 2022-05-21 | End: 2022-06-20

## 2022-04-20 RX ORDER — MEDROXYPROGESTERONE ACETATE 150 MG/ML
150 INJECTION, SUSPENSION INTRAMUSCULAR
Status: COMPLETED | OUTPATIENT
Start: 2022-04-20 | End: 2022-12-20

## 2022-04-20 RX ORDER — DEXTROAMPHETAMINE SACCHARATE, AMPHETAMINE ASPARTATE MONOHYDRATE, DEXTROAMPHETAMINE SULFATE AND AMPHETAMINE SULFATE 7.5; 7.5; 7.5; 7.5 MG/1; MG/1; MG/1; MG/1
30 CAPSULE, EXTENDED RELEASE ORAL DAILY
Qty: 30 CAPSULE | Refills: 0 | Status: SHIPPED | OUTPATIENT
Start: 2022-06-21 | End: 2022-07-21

## 2022-04-20 RX ADMIN — MEDROXYPROGESTERONE ACETATE 150 MG: 150 INJECTION, SUSPENSION INTRAMUSCULAR at 15:30

## 2022-04-20 ASSESSMENT — ANXIETY QUESTIONNAIRES
7. FEELING AFRAID AS IF SOMETHING AWFUL MIGHT HAPPEN: MORE THAN HALF THE DAYS
3. WORRYING TOO MUCH ABOUT DIFFERENT THINGS: NEARLY EVERY DAY
GAD7 TOTAL SCORE: 17
7. FEELING AFRAID AS IF SOMETHING AWFUL MIGHT HAPPEN: MORE THAN HALF THE DAYS
5. BEING SO RESTLESS THAT IT IS HARD TO SIT STILL: MORE THAN HALF THE DAYS
1. FEELING NERVOUS, ANXIOUS, OR ON EDGE: MORE THAN HALF THE DAYS
2. NOT BEING ABLE TO STOP OR CONTROL WORRYING: NEARLY EVERY DAY
GAD7 TOTAL SCORE: 17
4. TROUBLE RELAXING: MORE THAN HALF THE DAYS
GAD7 TOTAL SCORE: 17
6. BECOMING EASILY ANNOYED OR IRRITABLE: NEARLY EVERY DAY

## 2022-04-20 ASSESSMENT — PATIENT HEALTH QUESTIONNAIRE - PHQ9
SUM OF ALL RESPONSES TO PHQ QUESTIONS 1-9: 17
SUM OF ALL RESPONSES TO PHQ QUESTIONS 1-9: 17
10. IF YOU CHECKED OFF ANY PROBLEMS, HOW DIFFICULT HAVE THESE PROBLEMS MADE IT FOR YOU TO DO YOUR WORK, TAKE CARE OF THINGS AT HOME, OR GET ALONG WITH OTHER PEOPLE: VERY DIFFICULT

## 2022-04-20 NOTE — PROGRESS NOTES
Assessment/ Plan     1. Attention deficit hyperactivity disorder (ADHD), combined type  Lorraine would like to restart her ADHD medication.  She had another pregnancy and is now done breast-feeding.  She feels like her anxiety is really ramped up to get mostly related to being unfocused.  We discussed starting Adderall first and then seeing where her anxiety is at.  If is not under good control at that time we can always add something for anxiety.  - amphetamine-dextroamphetamine (ADDERALL XR) 30 MG 24 hr capsule; Take 1 capsule (30 mg) by mouth daily  Dispense: 30 capsule; Refill: 0  - amphetamine-dextroamphetamine (ADDERALL XR) 30 MG 24 hr capsule; Take 1 capsule (30 mg) by mouth daily  Dispense: 30 capsule; Refill: 0  - amphetamine-dextroamphetamine (ADDERALL XR) 30 MG 24 hr capsule; Take 1 capsule (30 mg) by mouth daily  Dispense: 30 capsule; Refill: 0    2. Depo-Provera contraceptive status  After discussion of options of birth control she would like to restart Depo-Provera.  - medroxyPROGESTERone (DEPO-PROVERA) injection 150 mg    3. Generalized anxiety disorder  Will monitor for now.  We will see if things improve with restarting her Adderall.  If not, she can let me know and we can think about adding another anxiety medication.      Subjective:      Lorraine Harris is a 37 year old female who presents for restart of ADHD medications.  She is been off it now for about a year and a half for pregnancy and breast-feeding.  She feels like she really has a hard time focusing and keeping on task.  She does feel like this contributes to her anxiety.  She would also like to discuss birth control.Had a discussion with the patient regarding available contraceptive methods including oral contraceptives, the NuvaRing, Depo-Provera injection, IUD, and Nexplanon.  Reviewed the benefits and potential side effects of all these methods.    Relevant past medical, family, surgical, and social history reviewed with patient, unless  "noted in HPI, not pertinent for this visit.  Medications were discussed and reconciled.   Review of Systems   A 12 point comprehensive review of systems was negative except as noted.      Current Outpatient Medications   Medication Sig Dispense Refill     amphetamine-dextroamphetamine (ADDERALL XR) 30 MG 24 hr capsule Take 1 capsule (30 mg) by mouth daily 30 capsule 0     [START ON 5/21/2022] amphetamine-dextroamphetamine (ADDERALL XR) 30 MG 24 hr capsule Take 1 capsule (30 mg) by mouth daily 30 capsule 0     [START ON 6/21/2022] amphetamine-dextroamphetamine (ADDERALL XR) 30 MG 24 hr capsule Take 1 capsule (30 mg) by mouth daily 30 capsule 0     omeprazole (PRILOSEC) 20 MG capsule [OMEPRAZOLE (PRILOSEC) 20 MG CAPSULE] Take 20 mg by mouth daily before breakfast.       prenatal vitamin iron-folic acid 27mg-0.8mg (PRENATAL S) 27 mg iron- 800 mcg Tab tablet [PRENATAL VITAMIN IRON-FOLIC ACID 27MG-0.8MG (PRENATAL S) 27 MG IRON- 800 MCG TAB TABLET] Take 1 tablet by mouth daily.       Probiotic Product (PROBIOTIC-10) CHEW            Objective:     /66   Pulse 64   Resp 16   Ht 1.6 m (5' 3\")   Wt 84.3 kg (185 lb 12.8 oz)   LMP  (LMP Unknown)   BMI 32.91 kg/m      Body mass index is 32.91 kg/m .       General appearance: alert, appears stated age and cooperative    No results found for this or any previous visit (from the past 168 hour(s)).       This note has been dictated using voice recognition software. Any grammatical or context distortions are unintentional and inherent to the software  Answers for HPI/ROS submitted by the patient on 4/20/2022  If you checked off any problems, how difficult have these problems made it for you to do your work, take care of things at home, or get along with other people?: Very difficult  PHQ9 TOTAL SCORE: 17  IVETTE 7 TOTAL SCORE: 17  Depression/Anxiety: Depression & Anxiety  Status since last visit:: worse  Anxiety since last: : worse  Other associated symptoms of depression:: " Yes  Other associated symotome: : Yes  Significant life event: : other  Anxious:: Yes  Current substance use:: No  How many servings of fruits and vegetables do you eat daily?: 0-1  On average, how many sweetened beverages do you drink each day (Examples: soda, juice, sweet tea, etc.  Do NOT count diet or artificially sweetened beverages)?: 1  How many minutes a day do you exercise enough to make your heart beat faster?: 10 to 19  How many days a week do you exercise enough to make your heart beat faster?: 3 or less  How many days per week do you miss taking your medication?: 0  What is the reason for your visit today?: Add ,anxiety ,depression, moods,birth control,  When did your symptoms begin?: More than a month  How would you describe these symptoms?: Moderate  Are your symptoms:: Worsening

## 2022-04-20 NOTE — LETTER
[facility]  04/20/22  Patient: Lorraine Harris  YOB: 1985  Medical Record Number: [id number]                                                                                  Non-Opioid Controlled Substance Agreement    This is an agreement between you and your provider regarding safe and appropriate use of controlled substances prescribed by your care team. Controlled substances are?medicines that can cause physical and mental dependence (abuse).     There are strict laws about having and using these medicines. We here at [facility] are  committed to working with you in your efforts to get better. To support you in this work, we'll help you schedule regular office appointments for medicine refills. If we must cancel or change your appointment for any reason, we'll make sure you have enough medicine to last until your next appointment.     As a Provider, I will:     Listen carefully to your concerns while treating you with respect.     Recommend a treatment plan that I believe is in your best interest and may involve therapies other than medicine.      Talk with you often about the possible benefits and the risk of harm of any medicine that we prescribe for you.    Assess the safety of this medicine and check how well it works.      Provide a plan on how to taper (discontinue or go off) using this medicine if the decision is made to stop its use.      ::  As a Patient, I understand controlled substances:       Are prescribed by my care provider to help me function or work and manage my condition(s).?    Are strong medicines and can cause serious side effects.       Need to be taken exactly as prescribed.?Combining controlled substances with certain medicines or chemicals (such as illegal drugs, alcohol, sedatives, sleeping pills, and benzodiazepines) can be dangerous or even fatal.? If I stop taking my medicines suddenly, I may have severe withdrawal symptoms.     The  risks, benefits, and side effects of these medicine(s) were explained to me. I agree that:    1. I will take part in other treatments as advised by my care team. This may be psychiatry or counseling, physical therapy, behavioral therapy, group treatment or a referral to specialist.    2. I will keep all my appointments and understand this is part of the monitoring of controlled substances.?My care team may require an office visit for EVERY controlled substance refill. If I miss appointments or don t follow instructions, my care team may stop my medicine    3. I will take my medicines as prescribed. I will not change the dose or schedule unless my care team tells me to. There will be no refills if I run out early.      4. I may be asked to come to the clinic and complete a urine drug test or complete a pill count. If I don t give a urine sample or participate in a pill count, the care team may stop my medicine.    5. I will only receive controlled substance prescriptions from this clinic. If I am treated by another provider, I will tell them that I am taking controlled substances and that I have a treatment agreement with this provider. I will inform my [facility] care team within one business day if I am given a prescription for any controlled substance by another healthcare provider. My [facility] care team can contact other providers and pharmacists about my use of any medicines.    6. It is up to me to make sure that I don't run out of my medicines on weekends or holidays.?If my care team is willing to refill my prescription without a visit, I must request refills only during office hours. Refills may take up to 3 business days to process. I will use one pharmacy to fill all my controlled substance prescriptions. I will notify the clinic about any changes to my insurance or medicine availability.    7. I am responsible for my prescriptions. If the medicine/prescription is lost, stolen or destroyed,  it will not be replaced.?I also agree not to share controlled substance medicines with anyone.     8. I am aware I should not use any illegal or recreational drugs. I agree not to drink alcohol unless my care team says I can.     9. If I enroll in the [place] Medical Cannabis program, I will tell my care team before my next refill.    10. I will tell my care team right away if I become pregnant, have a new medical problem treated outside of my regular clinic, or have a change in my medicines.     11. I understand that this medicine can affect my thinking, judgment and reaction time.? Alcohol and drugs affect the brain and body, which can affect the safety of my driving. Being under the influence of alcohol or drugs can affect my decision-making, behaviors, personal safety and the safety of others. Driving while impaired (DWI) can occur if a person is driving, operating or in physical control of a car, motorcycle, boat, snowmobile, ATV, motorbike, off-road vehicle or any other motor vehicle ([place] Statute 169A.20). I understand the risk if I choose to drive or operate any vehicle or machinery.    I understand that if I do not follow any of the conditions above, my prescriptions or treatment may be stopped or changed.   I agree that my provider, clinic care team and pharmacy may work with any city, state or federal law enforcement agency that investigates the misuse, sale or other diversion of my controlled medicine. I will allow my provider to discuss my care with, or share a copy of, this agreement with any other treating provider, pharmacy or emergency room where I receive care.     I have read this agreement and have asked questions about anything I did not understand.    ________________________________________________________  Patient Signature - Lorraine NUÑEZ Harris     ___________________                   Date     ________________________________________________________  Provider Signature - Jeannie Armando        ___________________                   Date     ________________________________________________________  Witness Signature (required if provider not present while patient signing)          ___________________                   Date

## 2022-04-21 ASSESSMENT — PATIENT HEALTH QUESTIONNAIRE - PHQ9: SUM OF ALL RESPONSES TO PHQ QUESTIONS 1-9: 17

## 2022-04-21 ASSESSMENT — ANXIETY QUESTIONNAIRES: GAD7 TOTAL SCORE: 17

## 2022-06-28 ENCOUNTER — MYC REFILL (OUTPATIENT)
Dept: FAMILY MEDICINE | Facility: CLINIC | Age: 37
End: 2022-06-28

## 2022-06-28 DIAGNOSIS — F90.2 ATTENTION DEFICIT HYPERACTIVITY DISORDER (ADHD), COMBINED TYPE: ICD-10-CM

## 2022-06-29 RX ORDER — DEXTROAMPHETAMINE SACCHARATE, AMPHETAMINE ASPARTATE MONOHYDRATE, DEXTROAMPHETAMINE SULFATE AND AMPHETAMINE SULFATE 7.5; 7.5; 7.5; 7.5 MG/1; MG/1; MG/1; MG/1
30 CAPSULE, EXTENDED RELEASE ORAL DAILY
Qty: 30 CAPSULE | Refills: 0 | Status: SHIPPED | OUTPATIENT
Start: 2022-06-29 | End: 2022-07-27

## 2022-07-11 ENCOUNTER — ALLIED HEALTH/NURSE VISIT (OUTPATIENT)
Dept: FAMILY MEDICINE | Facility: CLINIC | Age: 37
End: 2022-07-11
Payer: COMMERCIAL

## 2022-07-11 DIAGNOSIS — Z30.42 SURVEILLANCE FOR DEPO-PROVERA CONTRACEPTION: Primary | ICD-10-CM

## 2022-07-11 PROCEDURE — 96372 THER/PROPH/DIAG INJ SC/IM: CPT | Performed by: FAMILY MEDICINE

## 2022-07-11 RX ADMIN — MEDROXYPROGESTERONE ACETATE 150 MG: 150 INJECTION, SUSPENSION INTRAMUSCULAR at 11:35

## 2022-07-11 NOTE — NURSING NOTE
Clinic Administered Medication Documentation    Administrations This Visit     medroxyPROGESTERone (DEPO-PROVERA) injection 150 mg     Admin Date  07/11/2022 Action  Given Dose  150 mg Route  Intramuscular Site  Right Deltoid Administered By  Joe Kincaid    Ordering Provider: Jeannie Armando    Patient Supplied?: No                  Depo Provera Documentation    URINE HCG: not indicated    Depo-Provera Standing Order inclusion/exclusion criteria reviewed.   Patient meets: inclusion criteria     BP: Data Unavailable  LAST PAP/EXAM: No results found for: PAP    Prior to injection, verified patient identity using patient's name and date of birth. Medication was administered. Please see MAR and medication order for additional information.     Was entire vial of medication used? Yes  Vial/Syringe: Single dose vial  Expiration Date:  1/1/2024     Patient instructed to remain in clinic for 15 minutes and report any adverse reaction to staff immediately .  NEXT INJECTION DUE: 9/26/22 - 10/10/22       Joe Kincaid CMA

## 2022-07-27 ENCOUNTER — MYC REFILL (OUTPATIENT)
Dept: FAMILY MEDICINE | Facility: CLINIC | Age: 37
End: 2022-07-27

## 2022-07-27 DIAGNOSIS — F90.2 ATTENTION DEFICIT HYPERACTIVITY DISORDER (ADHD), COMBINED TYPE: ICD-10-CM

## 2022-07-27 RX ORDER — DEXTROAMPHETAMINE SACCHARATE, AMPHETAMINE ASPARTATE MONOHYDRATE, DEXTROAMPHETAMINE SULFATE AND AMPHETAMINE SULFATE 7.5; 7.5; 7.5; 7.5 MG/1; MG/1; MG/1; MG/1
30 CAPSULE, EXTENDED RELEASE ORAL DAILY
Qty: 30 CAPSULE | Refills: 0 | Status: SHIPPED | OUTPATIENT
Start: 2022-07-27 | End: 2022-08-30

## 2022-08-17 ENCOUNTER — LAB REQUISITION (OUTPATIENT)
Dept: LAB | Facility: HOSPITAL | Age: 37
End: 2022-08-17

## 2022-08-17 LAB — SARS-COV-2 RNA RESP QL NAA+PROBE: NEGATIVE

## 2022-08-17 PROCEDURE — U0005 INFEC AGEN DETEC AMPLI PROBE: HCPCS | Performed by: INTERNAL MEDICINE

## 2022-08-30 ENCOUNTER — MYC REFILL (OUTPATIENT)
Dept: FAMILY MEDICINE | Facility: CLINIC | Age: 37
End: 2022-08-30

## 2022-08-30 DIAGNOSIS — F90.2 ATTENTION DEFICIT HYPERACTIVITY DISORDER (ADHD), COMBINED TYPE: ICD-10-CM

## 2022-08-30 RX ORDER — DEXTROAMPHETAMINE SACCHARATE, AMPHETAMINE ASPARTATE MONOHYDRATE, DEXTROAMPHETAMINE SULFATE AND AMPHETAMINE SULFATE 7.5; 7.5; 7.5; 7.5 MG/1; MG/1; MG/1; MG/1
30 CAPSULE, EXTENDED RELEASE ORAL DAILY
Qty: 30 CAPSULE | Refills: 0 | Status: SHIPPED | OUTPATIENT
Start: 2022-08-30 | End: 2022-09-27

## 2022-09-01 ENCOUNTER — MYC MEDICAL ADVICE (OUTPATIENT)
Dept: FAMILY MEDICINE | Facility: CLINIC | Age: 37
End: 2022-09-01

## 2022-09-01 ENCOUNTER — E-VISIT (OUTPATIENT)
Dept: URGENT CARE | Facility: CLINIC | Age: 37
End: 2022-09-01
Payer: COMMERCIAL

## 2022-09-01 DIAGNOSIS — L70.0 ACNE VULGARIS: Primary | ICD-10-CM

## 2022-09-01 PROCEDURE — 99421 OL DIG E/M SVC 5-10 MIN: CPT | Performed by: PHYSICIAN ASSISTANT

## 2022-09-01 RX ORDER — DOXYCYCLINE 100 MG/1
100 CAPSULE ORAL DAILY
Qty: 30 CAPSULE | Refills: 2 | Status: SHIPPED | OUTPATIENT
Start: 2022-09-01 | End: 2023-11-17

## 2022-09-01 NOTE — PATIENT INSTRUCTIONS
Dear Lorraine Harris    After reviewing your responses, I've been able to diagnose you with acne, which is a common skin condition that causes red bumps or pimples to form on your skin. It occurs when pores become blocked with oil, dirt, or bacteria. Pores are openings in your skin where oil, sweat and hair are produced.     Based on your responses, I have prescribed Doxycycline to treat this. Please follow the instructions on the medication. If you experience irritation of your skin, new rash, or any other new symptoms, you should stop using this medication and contact your primary care provider.     If this treatment does not work for you or you will run out of refills, please plan to follow- up with your primary care provider to set refills for a longer period of time or to try other options.     Things you can do to help prevent this:     1. Use mild soap daily when you bathe (helps control oil) instead of harsh or drying soaps.     2. Use oil-free lotion and sunscreen (decreases irritation and keeps your pores from being clogged).     Thanks for choosing us as your health care partner,      Lorna Soares PA-C

## 2022-09-24 ENCOUNTER — HEALTH MAINTENANCE LETTER (OUTPATIENT)
Age: 37
End: 2022-09-24

## 2022-09-27 ENCOUNTER — MYC REFILL (OUTPATIENT)
Dept: FAMILY MEDICINE | Facility: CLINIC | Age: 37
End: 2022-09-27

## 2022-09-27 DIAGNOSIS — F90.2 ATTENTION DEFICIT HYPERACTIVITY DISORDER (ADHD), COMBINED TYPE: ICD-10-CM

## 2022-09-28 RX ORDER — DEXTROAMPHETAMINE SACCHARATE, AMPHETAMINE ASPARTATE MONOHYDRATE, DEXTROAMPHETAMINE SULFATE AND AMPHETAMINE SULFATE 7.5; 7.5; 7.5; 7.5 MG/1; MG/1; MG/1; MG/1
30 CAPSULE, EXTENDED RELEASE ORAL DAILY
Qty: 30 CAPSULE | Refills: 0 | Status: SHIPPED | OUTPATIENT
Start: 2022-09-28 | End: 2022-10-30

## 2022-10-04 ENCOUNTER — ALLIED HEALTH/NURSE VISIT (OUTPATIENT)
Dept: FAMILY MEDICINE | Facility: CLINIC | Age: 37
End: 2022-10-04
Payer: COMMERCIAL

## 2022-10-04 DIAGNOSIS — Z30.42 SURVEILLANCE FOR DEPO-PROVERA CONTRACEPTION: Primary | ICD-10-CM

## 2022-10-04 PROCEDURE — 96372 THER/PROPH/DIAG INJ SC/IM: CPT | Performed by: FAMILY MEDICINE

## 2022-10-04 PROCEDURE — 99207 PR NO CHARGE NURSE ONLY: CPT

## 2022-10-04 RX ADMIN — MEDROXYPROGESTERONE ACETATE 150 MG: 150 INJECTION, SUSPENSION INTRAMUSCULAR at 13:01

## 2022-10-04 NOTE — PROGRESS NOTES
BP: Data Unavailable    LAST PAP/EXAM: No results found for: PAP  URINE HCG:not indicated    The following medication was given:     MEDICATION: Depo Provera 150mg  ROUTE: IM  SITE: Deltoid - Left  : Mylan  LOT #: [id number]  EXP:1/2024  NEXT INJECTION DUE: 12/20/22 - 1/3/23   Provider: Dr Armando

## 2022-10-25 ENCOUNTER — ALLIED HEALTH/NURSE VISIT (OUTPATIENT)
Dept: FAMILY MEDICINE | Facility: CLINIC | Age: 37
End: 2022-10-25
Payer: COMMERCIAL

## 2022-10-25 DIAGNOSIS — Z23 NEEDS FLU SHOT: Primary | ICD-10-CM

## 2022-10-25 PROCEDURE — 90686 IIV4 VACC NO PRSV 0.5 ML IM: CPT

## 2022-10-25 PROCEDURE — 90471 IMMUNIZATION ADMIN: CPT

## 2022-10-30 ENCOUNTER — MYC REFILL (OUTPATIENT)
Dept: FAMILY MEDICINE | Facility: CLINIC | Age: 37
End: 2022-10-30

## 2022-10-30 DIAGNOSIS — F90.2 ATTENTION DEFICIT HYPERACTIVITY DISORDER (ADHD), COMBINED TYPE: ICD-10-CM

## 2022-11-01 RX ORDER — DEXTROAMPHETAMINE SACCHARATE, AMPHETAMINE ASPARTATE MONOHYDRATE, DEXTROAMPHETAMINE SULFATE AND AMPHETAMINE SULFATE 7.5; 7.5; 7.5; 7.5 MG/1; MG/1; MG/1; MG/1
30 CAPSULE, EXTENDED RELEASE ORAL DAILY
Qty: 30 CAPSULE | Refills: 0 | Status: SHIPPED | OUTPATIENT
Start: 2022-11-01 | End: 2022-11-30

## 2022-11-30 ENCOUNTER — MYC REFILL (OUTPATIENT)
Dept: FAMILY MEDICINE | Facility: CLINIC | Age: 37
End: 2022-11-30

## 2022-11-30 DIAGNOSIS — F90.2 ATTENTION DEFICIT HYPERACTIVITY DISORDER (ADHD), COMBINED TYPE: ICD-10-CM

## 2022-12-01 RX ORDER — DEXTROAMPHETAMINE SACCHARATE, AMPHETAMINE ASPARTATE MONOHYDRATE, DEXTROAMPHETAMINE SULFATE AND AMPHETAMINE SULFATE 7.5; 7.5; 7.5; 7.5 MG/1; MG/1; MG/1; MG/1
30 CAPSULE, EXTENDED RELEASE ORAL DAILY
Qty: 30 CAPSULE | Refills: 0 | Status: SHIPPED | OUTPATIENT
Start: 2022-12-01 | End: 2023-01-06

## 2022-12-20 ENCOUNTER — ALLIED HEALTH/NURSE VISIT (OUTPATIENT)
Dept: FAMILY MEDICINE | Facility: CLINIC | Age: 37
End: 2022-12-20
Payer: COMMERCIAL

## 2022-12-20 DIAGNOSIS — Z30.42 DEPO-PROVERA CONTRACEPTIVE STATUS: Primary | ICD-10-CM

## 2022-12-20 PROCEDURE — 96372 THER/PROPH/DIAG INJ SC/IM: CPT | Performed by: FAMILY MEDICINE

## 2022-12-20 PROCEDURE — 99207 PR NO CHARGE NURSE ONLY: CPT

## 2022-12-20 RX ADMIN — MEDROXYPROGESTERONE ACETATE 150 MG: 150 INJECTION, SUSPENSION INTRAMUSCULAR at 14:33

## 2022-12-20 NOTE — PROGRESS NOTES
BP: Data Unavailable    LAST PAP/EXAM: No results found for: PAP  URINE HCG:not indicated    The following medication was given:     MEDICATION: Depo Provera 150mg  ROUTE: IM  SITE: Deltoid - Left  : Mylan  LOT #: [id number]  EXP:04/01/2024  NEXT INJECTION DUE: 3/7/23 - 3/21/23   Provider: Dr. Armando

## 2023-01-06 ENCOUNTER — MYC REFILL (OUTPATIENT)
Dept: FAMILY MEDICINE | Facility: CLINIC | Age: 38
End: 2023-01-06

## 2023-01-06 DIAGNOSIS — F90.2 ATTENTION DEFICIT HYPERACTIVITY DISORDER (ADHD), COMBINED TYPE: ICD-10-CM

## 2023-01-09 ENCOUNTER — MYC REFILL (OUTPATIENT)
Dept: FAMILY MEDICINE | Facility: CLINIC | Age: 38
End: 2023-01-09

## 2023-01-09 DIAGNOSIS — F90.2 ATTENTION DEFICIT HYPERACTIVITY DISORDER (ADHD), COMBINED TYPE: ICD-10-CM

## 2023-01-09 RX ORDER — DEXTROAMPHETAMINE SACCHARATE, AMPHETAMINE ASPARTATE MONOHYDRATE, DEXTROAMPHETAMINE SULFATE AND AMPHETAMINE SULFATE 2.5; 2.5; 2.5; 2.5 MG/1; MG/1; MG/1; MG/1
10 CAPSULE, EXTENDED RELEASE ORAL DAILY
Qty: 30 CAPSULE | Refills: 0 | Status: SHIPPED | OUTPATIENT
Start: 2023-01-09 | End: 2023-11-17

## 2023-01-09 RX ORDER — DEXTROAMPHETAMINE SACCHARATE, AMPHETAMINE ASPARTATE MONOHYDRATE, DEXTROAMPHETAMINE SULFATE AND AMPHETAMINE SULFATE 7.5; 7.5; 7.5; 7.5 MG/1; MG/1; MG/1; MG/1
30 CAPSULE, EXTENDED RELEASE ORAL DAILY
Qty: 30 CAPSULE | Refills: 0 | Status: SHIPPED | OUTPATIENT
Start: 2023-01-09 | End: 2023-01-09

## 2023-01-09 RX ORDER — DEXTROAMPHETAMINE SACCHARATE, AMPHETAMINE ASPARTATE MONOHYDRATE, DEXTROAMPHETAMINE SULFATE AND AMPHETAMINE SULFATE 7.5; 7.5; 7.5; 7.5 MG/1; MG/1; MG/1; MG/1
30 CAPSULE, EXTENDED RELEASE ORAL DAILY
Qty: 30 CAPSULE | Refills: 0 | Status: SHIPPED | OUTPATIENT
Start: 2023-01-09 | End: 2023-01-10

## 2023-01-09 RX ORDER — DEXTROAMPHETAMINE SACCHARATE, AMPHETAMINE ASPARTATE MONOHYDRATE, DEXTROAMPHETAMINE SULFATE AND AMPHETAMINE SULFATE 5; 5; 5; 5 MG/1; MG/1; MG/1; MG/1
20 CAPSULE, EXTENDED RELEASE ORAL DAILY
Qty: 30 CAPSULE | Refills: 0 | Status: SHIPPED | OUTPATIENT
Start: 2023-01-09 | End: 2023-11-17

## 2023-01-09 NOTE — TELEPHONE ENCOUNTER
Incoming call from patient stating the [id number] ([address]) is out of stock of Adderall XR 30 mg. Please send refill to [id number] ([place]).    Pending Prescriptions:                       Disp   Refills    amphetamine-dextroamphetamine (ADDERALL X*30 cap*0            Sig: Take 1 capsule (30 mg) by mouth daily

## 2023-01-09 NOTE — TELEPHONE ENCOUNTER
Patient called back and states now the [facility] #[id number] ([facility])  is out stock of Adderall XR 30 mg.  Patient requesting Adderall XR 20 mg and Adderall XR 10 mg to be sent to [facility] #[id number] ([place]). Please advise.      Pending Prescriptions:                       Disp   Refills    amphetamine-dextroamphetamine (ADDERALL X*30 cap*0            Sig: Take 1 capsule (20 mg) by mouth daily    amphetamine-dextroamphetamine (ADDERALL X*30 cap*0            Sig: Take 1 capsule (10 mg) by mouth daily

## 2023-01-10 DIAGNOSIS — F90.2 ATTENTION DEFICIT HYPERACTIVITY DISORDER (ADHD), COMBINED TYPE: ICD-10-CM

## 2023-01-10 RX ORDER — DEXTROAMPHETAMINE SACCHARATE, AMPHETAMINE ASPARTATE MONOHYDRATE, DEXTROAMPHETAMINE SULFATE AND AMPHETAMINE SULFATE 7.5; 7.5; 7.5; 7.5 MG/1; MG/1; MG/1; MG/1
30 CAPSULE, EXTENDED RELEASE ORAL DAILY
Qty: 30 CAPSULE | Refills: 0 | Status: SHIPPED | OUTPATIENT
Start: 2023-01-10 | End: 2023-02-07

## 2023-01-10 NOTE — TELEPHONE ENCOUNTER
Ambrosio calling to see if provider will ok Adderall XR 30 mg capsules as the pharmacy does not have the Adderall 20mg. Please advise. If ok for 30mg capsules please send new prescription to [facility]

## 2023-01-10 NOTE — TELEPHONE ENCOUNTER
Medication Question or Refill        What medication are you calling about (include dose and sig)?:amphetamine-dextroamphetamine (ADDERALL XR) 20 MG 24 hr capsule     Controlled Substance Agreement on file:   CSA -- Patient Level:     [Media Unavailable] Controlled Substance Agreement - Non - Opioid - Scan on 4/28/2022 12:20 PM: NON-OPIOID CONTROLLED SUBSTANCE AGREEMENT       Who prescribed the medication?:     Do you need a refill? Yes:     When did you use the medication last? They are going to cancel the adderall but wants the pt to request   adderall XR 30 mg capsules     Patient offered an appointment? {YES     Do you have any questions or concerns?  {YES     Preferred Pharmacy [id number] - [place] [address]  [address] [place] [address]  Phone: [phone] Fax: [phone]      Could we send this information to you in [facility] or would you prefer to receive a phone call?:   Patient would prefer a phone call   Okay to leave a detailed message?: Yes at Cell number on file:    Telephone Information:   Mobile [phone]

## 2023-02-07 ENCOUNTER — MYC REFILL (OUTPATIENT)
Dept: FAMILY MEDICINE | Facility: CLINIC | Age: 38
End: 2023-02-07
Payer: COMMERCIAL

## 2023-02-07 DIAGNOSIS — F90.2 ATTENTION DEFICIT HYPERACTIVITY DISORDER (ADHD), COMBINED TYPE: ICD-10-CM

## 2023-02-07 RX ORDER — DEXTROAMPHETAMINE SACCHARATE, AMPHETAMINE ASPARTATE MONOHYDRATE, DEXTROAMPHETAMINE SULFATE AND AMPHETAMINE SULFATE 7.5; 7.5; 7.5; 7.5 MG/1; MG/1; MG/1; MG/1
30 CAPSULE, EXTENDED RELEASE ORAL DAILY
Qty: 30 CAPSULE | Refills: 0 | Status: SHIPPED | OUTPATIENT
Start: 2023-02-07 | End: 2023-02-14

## 2023-02-09 ENCOUNTER — TELEPHONE (OUTPATIENT)
Dept: FAMILY MEDICINE | Facility: CLINIC | Age: 38
End: 2023-02-09
Payer: COMMERCIAL

## 2023-02-09 DIAGNOSIS — F90.2 ATTENTION DEFICIT HYPERACTIVITY DISORDER (ADHD), COMBINED TYPE: ICD-10-CM

## 2023-02-10 NOTE — TELEPHONE ENCOUNTER
Called and spoke with pharmacist for clarification. Patient came to  Adderall XR 30 mg prescription yesterday 2/9/23 and pharmacy only had 6 pills in stock to dispense. They will have another shipment in by Monday for the patient to  the remainder of her prescription, but will need another prescription to dispense. Medication and pharmacy pended.    Gabriela Cruz RN

## 2023-02-10 NOTE — TELEPHONE ENCOUNTER
Reason for Call:   prescription    Detailed comments: Pharmacist called and gave the patient 6 capsules of adderal xr 30 mg they need a new RX sent over by Monday    Phone Number Patient can be reached at: Cell number on file:    Telephone Information:   Mobile [phone]    n/A    Best Time: N/A    Can we leave a detailed message on this number?  N/A    Call taken on 2/9/2023 at 7:50 PM by ASHVIN VASQUEZ

## 2023-02-14 RX ORDER — DEXTROAMPHETAMINE SACCHARATE, AMPHETAMINE ASPARTATE MONOHYDRATE, DEXTROAMPHETAMINE SULFATE AND AMPHETAMINE SULFATE 7.5; 7.5; 7.5; 7.5 MG/1; MG/1; MG/1; MG/1
30 CAPSULE, EXTENDED RELEASE ORAL DAILY
Qty: 30 CAPSULE | Refills: 0 | Status: SHIPPED | OUTPATIENT
Start: 2023-02-14 | End: 2023-03-14

## 2023-02-14 NOTE — TELEPHONE ENCOUNTER
Rashad from [facility] called stating they need a new prescription sent to [facility] for patients adderall as she is due and she was only given 6 pills on 2/9/23 as pharmacy has run out of that medication but now has a new shipment. Please send medication to the pharmacy.

## 2023-03-01 ENCOUNTER — MYC MEDICAL ADVICE (OUTPATIENT)
Dept: FAMILY MEDICINE | Facility: CLINIC | Age: 38
End: 2023-03-01
Payer: COMMERCIAL

## 2023-03-01 ENCOUNTER — TELEPHONE (OUTPATIENT)
Dept: FAMILY MEDICINE | Facility: CLINIC | Age: 38
End: 2023-03-01
Payer: COMMERCIAL

## 2023-03-01 DIAGNOSIS — L70.9 ACNE, UNSPECIFIED ACNE TYPE: Primary | ICD-10-CM

## 2023-03-02 ENCOUNTER — OFFICE VISIT (OUTPATIENT)
Dept: DERMATOLOGY | Facility: CLINIC | Age: 38
End: 2023-03-02
Payer: COMMERCIAL

## 2023-03-02 DIAGNOSIS — L70.9 ACNE, UNSPECIFIED ACNE TYPE: ICD-10-CM

## 2023-03-02 PROCEDURE — 99204 OFFICE O/P NEW MOD 45 MIN: CPT | Performed by: DERMATOLOGY

## 2023-03-02 RX ORDER — TRETINOIN 0.25 MG/G
CREAM TOPICAL
Qty: 45 G | Refills: 11 | Status: SHIPPED | OUTPATIENT
Start: 2023-03-02

## 2023-03-02 RX ORDER — SPIRONOLACTONE 50 MG/1
50 TABLET, FILM COATED ORAL DAILY
Qty: 30 TABLET | Refills: 5 | Status: SHIPPED | OUTPATIENT
Start: 2023-03-02 | End: 2024-01-23

## 2023-03-02 RX ORDER — CLINDAMYCIN PHOSPHATE 10 UG/ML
LOTION TOPICAL
Qty: 60 ML | Refills: 11 | Status: SHIPPED | OUTPATIENT
Start: 2023-03-02 | End: 2023-09-21

## 2023-03-02 ASSESSMENT — PAIN SCALES - GENERAL: PAINLEVEL: NO PAIN (0)

## 2023-03-02 NOTE — LETTER
3/2/2023       RE: Lorraine Harris  [address]  [facility] [address]     Dear Colleague,    Thank you for referring your patient, Lorraine Harris, to the [facility] DERMATOLOGY [facility] [place] [facility]. Please see a copy of my visit note below.    [facility] Dermatology Note  Encounter Date: Mar 2, 2023  Office Visit     Dermatology Problem List:  1. Acne Vulgaris  ____________________________________________    Assessment & Plan:    # Acne vulgaris. Chronic, flare/not at goal.   Discussed with patient the treatment course for acne (spironolactine and topicals vs Acutane). Patient decided she would not like to have to wait to start treating her acne and opted to start spironolactone. Discussed with patient the risk profile for this medication, including hypotension. Patient states that her blood pressure often runs low. Counseled patient that taking spironolactone before bed might help mitigate this side effect.  - Start spironolactone, 50mg once a day (take before bed to avoid hypotension).  - Start tretinoin 0.025% cream before bed (use a pea sized amount all over the face).  - Start clindamycin 1% lotion in the morning.  - For in the shower, OTC benzoyl peroxide 4-5% wash. PanOxyl or Differin Cleanser.   - Use lotions (CeraVae is good, or vanicream light)  - Sun protection: Counseled SPF30+ sunscreen, UPF clothing, sun avoidance, tanning bed avoidance.    Procedures Performed:   None    Follow-up: 3 month(s) in-person, or earlier for new or changing lesions    Staff, Medical Student and Scribe:   Анна Vargas, MS3 seen and staffed with Aly Maddox MD    Provider Disclosure:   The documentation recorded by the scribe accurately reflects the services I personally performed and the decisions made by me.    Staff Physician:  I was present with the medical student who participated in the service and in the documentation of the note. I  "have verified the history and personally performed the physical exam and medical decision making. I agree with the assessment and plan of care as documented in the note.     Aly Maddox MD  Pronouns: he/him/his    Department of Dermatology  [facility]: Phone: [phone], Fax:[phone]  [facility]: Phone: [phone] Fax: [phone]  ____________________________________________    CC: Acne    HPI:  Ms. Lorraine Harris is a(n) 38 year old female who presents today as a new patient for evaluation of acne vulgaris. Patient was seen in [facility] on 9/1/2022, at which time patient was started on doxycycline for treatment of acne.    Her acne sprung up over year ago after she had her second child. At that time, she saw her PCP and got an antibiotic and cream (doesn't remember what specifically). She took doxycycline and it improved the acne significantly. However, she had diarrhea and nausea from the doxycycline. She states prior to starting her injectable birth control, she had very irregular periods. She also endorses hair loss and male pattern hair growth (chin and upper lip).    Today, she states that her acne is \"mild.\" She has been using 3% benzoyl peroxide, clean and clear, neutrogena face wash. She washes her face daily and does not use any scented products or makeup.    Patient is otherwise feeling well, without additional skin concerns.    Labs Reviewed:  None    Physical Exam:  Vitals: There were no vitals taken for this visit.  SKIN: Acne exam, which includes the face, neck, upper central chest, and upper central back was performed.  - Scattered papules and pustules on the forehead and the temples.  - No other lesions of concern on areas examined.     Medications:  Current Outpatient Medications   Medication     amphetamine-dextroamphetamine (ADDERALL XR) 30 MG 24 hr capsule "     clindamycin (CLEOCIN T) 1 % external lotion     omeprazole (PRILOSEC) 20 MG capsule     Probiotic Product (PROBIOTIC-10) CHEW     spironolactone (ALDACTONE) 50 MG tablet     tretinoin (RETIN-A) 0.025 % external cream     amphetamine-dextroamphetamine (ADDERALL XR) 10 MG 24 hr capsule     amphetamine-dextroamphetamine (ADDERALL XR) 20 MG 24 hr capsule     doxycycline hyclate (VIBRAMYCIN) 100 MG capsule     prenatal vitamin iron-folic acid 27mg-0.8mg (PRENATAL S) 27 mg iron- 800 mcg Tab tablet     No current facility-administered medications for this visit.      Past Medical History:   Patient Active Problem List   Diagnosis     Elevated liver enzymes     Status post  delivery      delivery delivered     AMA (advanced maternal age) multigravida 35+     Attention deficit hyperactivity disorder (ADHD), combined type     Past Medical History:   Diagnosis Date     AMA (advanced maternal age) multigravida 35+ 2021     Anxiety      Depression      GERD (gastroesophageal reflux disease)      Mild or unspecified pre-eclampsia, unspecified as to episode of care     Created by Conversion      Pregnant state, incidental     Created by Conversion      Unspecified renal disease in pregnancy, unspecified as to episode of care(646.20)     Created by Conversion         CC Jeannie Armando MD  [address] [place] [address] on close of this encounter.

## 2023-03-02 NOTE — PATIENT INSTRUCTIONS
Start tretinoin 0.025% cream at nighttime before bed.   Start clindamycin 1% lotion in the morning.   In the shower, use an over-the-counter benzoyl peroxide 4-5% wash. PanOxyl or Differin Cleanser.   Start spironolactone 50 mg (1 tablet) once daily.    For moisturizer: Vanicream Lite Lotion.

## 2023-03-02 NOTE — NURSING NOTE
Dermatology Rooming Note    Lorraine Harris's goals for this visit include:   Chief Complaint   Patient presents with     Derm Problem     Acne: main concern  Check bump near inner eyelid on left eye     Connie Gallegos LPN

## 2023-03-02 NOTE — PROGRESS NOTES
[facility] Dermatology Note  Encounter Date: Mar 2, 2023  Office Visit     Dermatology Problem List:  1. Acne Vulgaris  ____________________________________________    Assessment & Plan:    # Acne vulgaris. Chronic, flare/not at goal.   Discussed with patient the treatment course for acne (spironolactine and topicals vs Acutane). Patient decided she would not like to have to wait to start treating her acne and opted to start spironolactone. Discussed with patient the risk profile for this medication, including hypotension. Patient states that her blood pressure often runs low. Counseled patient that taking spironolactone before bed might help mitigate this side effect.  - Start spironolactone, 50mg once a day (take before bed to avoid hypotension).  - Start tretinoin 0.025% cream before bed (use a pea sized amount all over the face).  - Start clindamycin 1% lotion in the morning.  - For in the shower, OTC benzoyl peroxide 4-5% wash. PanOxyl or Differin Cleanser.   - Use lotions (CeraVae is good, or vanicream light)  - Sun protection: Counseled SPF30+ sunscreen, UPF clothing, sun avoidance, tanning bed avoidance.    Procedures Performed:   None    Follow-up: 3 month(s) in-person, or earlier for new or changing lesions    Staff, Medical Student and Scribe:   Анна Vargas, MS3 seen and staffed with Aly Maddox MD    Provider Disclosure:   The documentation recorded by the scribe accurately reflects the services I personally performed and the decisions made by me.    Staff Physician:  I was present with the medical student who participated in the service and in the documentation of the note. I have verified the history and personally performed the physical exam and medical decision making. I agree with the assessment and plan of care as documented in the note.     Aly Maddox MD  Pronouns: he/him/his    Department of Dermatology  [facility]  "[facility]: Phone: [phone], Fax:[phone]  [facility]: Phone: [phone] Fax: [phone]  ____________________________________________    CC: Acne    HPI:  Ms. Lorraine Harris is a(n) 38 year old female who presents today as a new patient for evaluation of acne vulgaris. Patient was seen in [facility] on 9/1/2022, at which time patient was started on doxycycline for treatment of acne.    Her acne sprung up over year ago after she had her second child. At that time, she saw her PCP and got an antibiotic and cream (doesn't remember what specifically). She took doxycycline and it improved the acne significantly. However, she had diarrhea and nausea from the doxycycline. She states prior to starting her injectable birth control, she had very irregular periods. She also endorses hair loss and male pattern hair growth (chin and upper lip).    Today, she states that her acne is \"mild.\" She has been using 3% benzoyl peroxide, clean and clear, neutrogena face wash. She washes her face daily and does not use any scented products or makeup.    Patient is otherwise feeling well, without additional skin concerns.    Labs Reviewed:  None    Physical Exam:  Vitals: There were no vitals taken for this visit.  SKIN: Acne exam, which includes the face, neck, upper central chest, and upper central back was performed.  - Scattered papules and pustules on the forehead and the temples.  - No other lesions of concern on areas examined.     Medications:  Current Outpatient Medications   Medication     amphetamine-dextroamphetamine (ADDERALL XR) 30 MG 24 hr capsule     clindamycin (CLEOCIN T) 1 % external lotion     omeprazole (PRILOSEC) 20 MG capsule     Probiotic Product (PROBIOTIC-10) CHEW     spironolactone (ALDACTONE) 50 MG tablet     tretinoin (RETIN-A) 0.025 % external cream     amphetamine-dextroamphetamine (ADDERALL XR) 10 MG 24 hr capsule     " amphetamine-dextroamphetamine (ADDERALL XR) 20 MG 24 hr capsule     doxycycline hyclate (VIBRAMYCIN) 100 MG capsule     prenatal vitamin iron-folic acid 27mg-0.8mg (PRENATAL S) 27 mg iron- 800 mcg Tab tablet     No current facility-administered medications for this visit.      Past Medical History:   Patient Active Problem List   Diagnosis     Elevated liver enzymes     Status post  delivery      delivery delivered     AMA (advanced maternal age) multigravida 35+     Attention deficit hyperactivity disorder (ADHD), combined type     Past Medical History:   Diagnosis Date     AMA (advanced maternal age) multigravida 35+ 2021     Anxiety      Depression      GERD (gastroesophageal reflux disease)      Mild or unspecified pre-eclampsia, unspecified as to episode of care     Created by Conversion      Pregnant state, incidental     Created by Conversion      Unspecified renal disease in pregnancy, unspecified as to episode of care(646.20)     Created by Conversion         CC Jeannie Armando MD  [address] [place] [address] on close of this encounter.

## 2023-03-06 ENCOUNTER — TELEPHONE (OUTPATIENT)
Dept: FAMILY MEDICINE | Facility: CLINIC | Age: 38
End: 2023-03-06
Payer: COMMERCIAL

## 2023-03-06 DIAGNOSIS — Z30.42 DEPO-PROVERA CONTRACEPTIVE STATUS: Primary | ICD-10-CM

## 2023-03-06 RX ORDER — MEDROXYPROGESTERONE ACETATE 150 MG/ML
150 INJECTION, SUSPENSION INTRAMUSCULAR
Status: COMPLETED | OUTPATIENT
Start: 2023-03-07 | End: 2023-10-27

## 2023-03-07 ENCOUNTER — ALLIED HEALTH/NURSE VISIT (OUTPATIENT)
Dept: FAMILY MEDICINE | Facility: CLINIC | Age: 38
End: 2023-03-07
Payer: COMMERCIAL

## 2023-03-07 DIAGNOSIS — Z30.42 DEPO-PROVERA CONTRACEPTIVE STATUS: Primary | ICD-10-CM

## 2023-03-07 PROCEDURE — 99207 PR NO CHARGE NURSE ONLY: CPT

## 2023-03-07 PROCEDURE — 96372 THER/PROPH/DIAG INJ SC/IM: CPT | Performed by: FAMILY MEDICINE

## 2023-03-07 RX ADMIN — MEDROXYPROGESTERONE ACETATE 150 MG: 150 INJECTION, SUSPENSION INTRAMUSCULAR at 10:04

## 2023-03-07 NOTE — PROGRESS NOTES
BP: Data Unavailable    LAST PAP/EXAM: No results found for: PAP  URINE HCG:not indicated    The following medication was given:     MEDICATION: Depo Provera 150mg  ROUTE: IM  SITE: Deltoid - Left  : Mylan  LOT #: [id number]  EXP:[id number]  NEXT INJECTION DUE: 5/23/23 - 6/6/23   Provider: Dr. Armando

## 2023-03-14 ENCOUNTER — MYC REFILL (OUTPATIENT)
Dept: FAMILY MEDICINE | Facility: CLINIC | Age: 38
End: 2023-03-14
Payer: COMMERCIAL

## 2023-03-14 DIAGNOSIS — F90.2 ATTENTION DEFICIT HYPERACTIVITY DISORDER (ADHD), COMBINED TYPE: ICD-10-CM

## 2023-03-15 RX ORDER — DEXTROAMPHETAMINE SACCHARATE, AMPHETAMINE ASPARTATE MONOHYDRATE, DEXTROAMPHETAMINE SULFATE AND AMPHETAMINE SULFATE 7.5; 7.5; 7.5; 7.5 MG/1; MG/1; MG/1; MG/1
30 CAPSULE, EXTENDED RELEASE ORAL DAILY
Qty: 30 CAPSULE | Refills: 0 | Status: SHIPPED | OUTPATIENT
Start: 2023-03-15 | End: 2023-04-12

## 2023-03-23 ENCOUNTER — TELEPHONE (OUTPATIENT)
Dept: FAMILY MEDICINE | Facility: CLINIC | Age: 38
End: 2023-03-23
Payer: COMMERCIAL

## 2023-03-23 DIAGNOSIS — Z92.29 HAS RECEIVED FIRST DOSE OF HEPATITIS B VACCINE: Primary | ICD-10-CM

## 2023-03-23 NOTE — TELEPHONE ENCOUNTER
We would not typically check a hepatitis B titer in pregnancy.  We usually check a rubella titer and pregnancy.  If that is negative, then you typically get an MMR booster in the hospital before you leave.  She could check with her OB/GYN office about her labs that she had done with her first prenatal visit.  I do not think it would be worth checking a hepatitis B titer right now in my opinion.

## 2023-03-23 NOTE — TELEPHONE ENCOUNTER
Pt calling back.   She would like to have the Hep B titer done. 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 She previously had a titer done that came back positive back in 2021. She does not have the records but would rather just get the test done then try to track the records done. She is asking that Dr. Armando place an order for the Hep B titer.  Pt would like a call back and ok to leave a detailed VM.

## 2023-03-23 NOTE — TELEPHONE ENCOUNTER
Patient called to let us know that she just remembered that they told her she was no longer immune to Hep B while she was pregnant with her last child. She is wondering if she should have a tighter pulled or be re-vaccinated. 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   Please call patient to let her know what can be done.

## 2023-03-23 NOTE — TELEPHONE ENCOUNTER
Called and LMTCB,    Please relay message below from Dr. Armando to patient.    Bijan Romero RN     [facility]

## 2023-04-12 ENCOUNTER — MYC REFILL (OUTPATIENT)
Dept: FAMILY MEDICINE | Facility: CLINIC | Age: 38
End: 2023-04-12
Payer: COMMERCIAL

## 2023-04-12 DIAGNOSIS — F90.2 ATTENTION DEFICIT HYPERACTIVITY DISORDER (ADHD), COMBINED TYPE: ICD-10-CM

## 2023-04-13 NOTE — TELEPHONE ENCOUNTER
Routing refill request to provider for review/approval because:  Drug not [place] the [facility] [place]     Last Written Prescription Date:  3/15/23  Last Fill Quantity: 30,  # refills: 0   Last office visit provider:  4/20/22     Requested Prescriptions   Pending Prescriptions Disp Refills     amphetamine-dextroamphetamine (ADDERALL XR) 30 MG 24 hr capsule 30 capsule 0     Sig: Take 1 capsule (30 mg) by mouth daily       There is no refill protocol information for this order          EUGENE SINGH RN 04/13/23 2:07 PM

## 2023-04-14 RX ORDER — DEXTROAMPHETAMINE SACCHARATE, AMPHETAMINE ASPARTATE MONOHYDRATE, DEXTROAMPHETAMINE SULFATE AND AMPHETAMINE SULFATE 7.5; 7.5; 7.5; 7.5 MG/1; MG/1; MG/1; MG/1
30 CAPSULE, EXTENDED RELEASE ORAL DAILY
Qty: 30 CAPSULE | Refills: 0 | Status: SHIPPED | OUTPATIENT
Start: 2023-04-14 | End: 2023-05-13

## 2023-05-13 ENCOUNTER — MYC REFILL (OUTPATIENT)
Dept: FAMILY MEDICINE | Facility: CLINIC | Age: 38
End: 2023-05-13
Payer: COMMERCIAL

## 2023-05-13 DIAGNOSIS — F90.2 ATTENTION DEFICIT HYPERACTIVITY DISORDER (ADHD), COMBINED TYPE: ICD-10-CM

## 2023-05-15 RX ORDER — DEXTROAMPHETAMINE SACCHARATE, AMPHETAMINE ASPARTATE MONOHYDRATE, DEXTROAMPHETAMINE SULFATE AND AMPHETAMINE SULFATE 7.5; 7.5; 7.5; 7.5 MG/1; MG/1; MG/1; MG/1
30 CAPSULE, EXTENDED RELEASE ORAL DAILY
Qty: 30 CAPSULE | Refills: 0 | Status: SHIPPED | OUTPATIENT
Start: 2023-05-15 | End: 2023-06-11

## 2023-05-24 ENCOUNTER — ALLIED HEALTH/NURSE VISIT (OUTPATIENT)
Dept: FAMILY MEDICINE | Facility: CLINIC | Age: 38
End: 2023-05-24
Payer: COMMERCIAL

## 2023-05-24 ENCOUNTER — LAB (OUTPATIENT)
Dept: LAB | Facility: CLINIC | Age: 38
End: 2023-05-24
Payer: COMMERCIAL

## 2023-05-24 DIAGNOSIS — Z30.42 ENCOUNTER FOR SURVEILLANCE OF INJECTABLE CONTRACEPTIVE: Primary | ICD-10-CM

## 2023-05-24 DIAGNOSIS — Z92.29 HAS RECEIVED FIRST DOSE OF HEPATITIS B VACCINE: ICD-10-CM

## 2023-05-24 LAB
HBV SURFACE AB SERPL IA-ACNC: 276.48 M[IU]/ML
HBV SURFACE AB SERPL IA-ACNC: REACTIVE M[IU]/ML

## 2023-05-24 PROCEDURE — 86706 HEP B SURFACE ANTIBODY: CPT

## 2023-05-24 PROCEDURE — 96372 THER/PROPH/DIAG INJ SC/IM: CPT | Performed by: FAMILY MEDICINE

## 2023-05-24 PROCEDURE — 96372 THER/PROPH/DIAG INJ SC/IM: CPT

## 2023-05-24 PROCEDURE — 99207 PR NO CHARGE NURSE ONLY: CPT

## 2023-05-24 PROCEDURE — 36415 COLL VENOUS BLD VENIPUNCTURE: CPT

## 2023-05-24 RX ADMIN — MEDROXYPROGESTERONE ACETATE 150 MG: 150 INJECTION, SUSPENSION INTRAMUSCULAR at 09:46

## 2023-05-24 NOTE — PROGRESS NOTES
Clinic Administered Medication Documentation      Depo Provera Documentation    Depo-Provera Standing Order inclusion/exclusion criteria reviewed.     Is this the initial or subsequent dose of Depo Provera? Subsequent dose - patient is within the acceptable window of time (11-15 weeks) for subsequent injection. Pregnancy test not indicated.    Patient meets: inclusion criteria     Is there an active order (written within the past 365 days, with administrations remaining, not ) in the chart? Yes.     Prior to injection, verified patient identity using patient's name and date of birth. Medication was administered. Please see MAR and medication order for additional information.     Vial/Syringe: Single dose vial. Was entire vial of medication used? Yes    Patient instructed to remain in clinic for 15 minutes and report any adverse reaction to staff immediately.  NEXT INJECTION DUE: 23 - 23  GIVEN IN RIGHT DELTOID.    Verified that the patient has refills remaining in their prescription.    Kelsey Steve, CMA

## 2023-07-10 ENCOUNTER — MYC REFILL (OUTPATIENT)
Dept: FAMILY MEDICINE | Facility: CLINIC | Age: 38
End: 2023-07-10
Payer: COMMERCIAL

## 2023-07-10 DIAGNOSIS — F90.2 ATTENTION DEFICIT HYPERACTIVITY DISORDER (ADHD), COMBINED TYPE: ICD-10-CM

## 2023-07-10 RX ORDER — DEXTROAMPHETAMINE SACCHARATE, AMPHETAMINE ASPARTATE MONOHYDRATE, DEXTROAMPHETAMINE SULFATE AND AMPHETAMINE SULFATE 7.5; 7.5; 7.5; 7.5 MG/1; MG/1; MG/1; MG/1
30 CAPSULE, EXTENDED RELEASE ORAL DAILY
Qty: 30 CAPSULE | Refills: 0 | Status: SHIPPED | OUTPATIENT
Start: 2023-07-10 | End: 2023-08-15

## 2023-08-10 ENCOUNTER — ALLIED HEALTH/NURSE VISIT (OUTPATIENT)
Dept: FAMILY MEDICINE | Facility: CLINIC | Age: 38
End: 2023-08-10
Payer: COMMERCIAL

## 2023-08-10 DIAGNOSIS — Z30.42 DEPO-PROVERA CONTRACEPTIVE STATUS: Primary | ICD-10-CM

## 2023-08-10 PROCEDURE — 96372 THER/PROPH/DIAG INJ SC/IM: CPT

## 2023-08-10 PROCEDURE — 96372 THER/PROPH/DIAG INJ SC/IM: CPT | Performed by: FAMILY MEDICINE

## 2023-08-10 PROCEDURE — 99207 PR NO CHARGE NURSE ONLY: CPT

## 2023-08-10 RX ADMIN — MEDROXYPROGESTERONE ACETATE 150 MG: 150 INJECTION, SUSPENSION INTRAMUSCULAR at 09:35

## 2023-08-10 NOTE — PROGRESS NOTES
Clinic Administered Medication Documentation      Depo Provera Documentation    Depo-Provera Standing Order inclusion/exclusion criteria reviewed.     Is this the initial or subsequent dose of Depo Provera? Subsequent dose - patient is within the acceptable window of time (11-15 weeks) for subsequent injection. Pregnancy test not indicated.    Patient meets: inclusion criteria     Is there an active order (written within the past 365 days, with administrations remaining, not ) in the chart? Yes.     Prior to injection, verified patient identity using patient's name and date of birth. Medication was administered. Please see MAR and medication order for additional information.     Vial/Syringe: Single dose vial. Was entire vial of medication used? Yes    Patient instructed to remain in clinic for 15 minutes and report any adverse reaction to staff immediately.  NEXT INJECTION DUE: 10/26/23 - 23    Verified that the patient has refills remaining in their prescription.  GIVEN TODAY IN LEFT DELTOID  Yuliya Murray CMA

## 2023-08-15 ENCOUNTER — MYC REFILL (OUTPATIENT)
Dept: FAMILY MEDICINE | Facility: CLINIC | Age: 38
End: 2023-08-15
Payer: COMMERCIAL

## 2023-08-15 DIAGNOSIS — F90.2 ATTENTION DEFICIT HYPERACTIVITY DISORDER (ADHD), COMBINED TYPE: ICD-10-CM

## 2023-08-15 NOTE — TELEPHONE ENCOUNTER
Routing refill request to provider for review/approval because:  Drug not on [place] [facility] [place]     Last Written Prescription Date:  7/10/2023  Last Fill Quantity: 30,  # refills: 0   Last office visit provider:  4/20/2022     Requested Prescriptions   Pending Prescriptions Disp Refills    amphetamine-dextroamphetamine (ADDERALL XR) 30 MG 24 hr capsule 30 capsule 0     Sig: Take 1 capsule (30 mg) by mouth daily       There is no refill protocol information for this order          Tamara Clark RN 08/15/23 4:15 PM

## 2023-08-16 RX ORDER — DEXTROAMPHETAMINE SACCHARATE, AMPHETAMINE ASPARTATE MONOHYDRATE, DEXTROAMPHETAMINE SULFATE AND AMPHETAMINE SULFATE 7.5; 7.5; 7.5; 7.5 MG/1; MG/1; MG/1; MG/1
30 CAPSULE, EXTENDED RELEASE ORAL DAILY
Qty: 30 CAPSULE | Refills: 0 | Status: SHIPPED | OUTPATIENT
Start: 2023-08-16 | End: 2023-09-12

## 2023-09-12 ENCOUNTER — MYC REFILL (OUTPATIENT)
Dept: FAMILY MEDICINE | Facility: CLINIC | Age: 38
End: 2023-09-12
Payer: COMMERCIAL

## 2023-09-12 DIAGNOSIS — F90.2 ATTENTION DEFICIT HYPERACTIVITY DISORDER (ADHD), COMBINED TYPE: ICD-10-CM

## 2023-09-13 RX ORDER — DEXTROAMPHETAMINE SACCHARATE, AMPHETAMINE ASPARTATE MONOHYDRATE, DEXTROAMPHETAMINE SULFATE AND AMPHETAMINE SULFATE 7.5; 7.5; 7.5; 7.5 MG/1; MG/1; MG/1; MG/1
30 CAPSULE, EXTENDED RELEASE ORAL DAILY
Qty: 30 CAPSULE | Refills: 0 | Status: SHIPPED | OUTPATIENT
Start: 2023-09-13 | End: 2023-10-12

## 2023-09-21 ENCOUNTER — OFFICE VISIT (OUTPATIENT)
Dept: DERMATOLOGY | Facility: CLINIC | Age: 38
End: 2023-09-21
Payer: COMMERCIAL

## 2023-09-21 DIAGNOSIS — L70.9 ACNE, UNSPECIFIED ACNE TYPE: ICD-10-CM

## 2023-09-21 DIAGNOSIS — L70.0 ACNE VULGARIS: Primary | ICD-10-CM

## 2023-09-21 PROCEDURE — 99213 OFFICE O/P EST LOW 20 MIN: CPT | Performed by: PHYSICIAN ASSISTANT

## 2023-09-21 RX ORDER — MINOXIDIL 2.5 MG/1
TABLET ORAL
Qty: 45 TABLET | Refills: 3 | Status: SHIPPED | OUTPATIENT
Start: 2023-09-21

## 2023-09-21 RX ORDER — CLINDAMYCIN PHOSPHATE 10 UG/ML
LOTION TOPICAL
Qty: 60 ML | Refills: 11 | Status: SHIPPED | OUTPATIENT
Start: 2023-09-21 | End: 2024-02-19

## 2023-09-21 ASSESSMENT — PAIN SCALES - GENERAL: PAINLEVEL: NO PAIN (0)

## 2023-09-21 NOTE — LETTER
2023         RE: Lorraine Harris  [address]  [facility] [address]        Dear Colleague,    Thank you for referring your patient, Lorraine Harris, to the [facility]. Please see a copy of my visit note below.    Lorraine Harris is an extremely pleasant 38 year old year old female patient here today for recheck acne, hair loss, and unwanted hair growth. She notes she stopped spironolactone since this caused dizziness. She notes she is using panoxyl and clindamycin lotion. She stopped tretinoin 0.025% cream since this caused dryness and irritation. She notes she has had thinning of hair throughout the years, her father has some thinning. She has tried nioxin without results. Patient has no other skin complaints today.  Remainder of the HPI, Meds, PMH, Allergies, FH, and SH was reviewed in chart.   Past Medical History:   Diagnosis Date     AMA (advanced maternal age) multigravida 35+ 2021     Anxiety      Depression      GERD (gastroesophageal reflux disease)      Mild or unspecified pre-eclampsia, unspecified as to episode of care     Created by Conversion      Pregnant state, incidental     Created by Conversion      Unspecified renal disease in pregnancy, unspecified as to episode of care(646.20)     Created by Conversion        Past Surgical History:   Procedure Laterality Date     ARTHROSCOPIC REPAIR ACL      and lateral release     C/SECTION, LOW TRANSVERSE        SECTION N/A 10/16/2018    Procedure: PRIMARY  SECTION;  Surgeon: Tucker Llamas MD;  Location: [facility];  Service:       SECTION N/A 6/15/2021    Procedure:  SECTION;  Surgeon: Dorcas Larios DO;  Location: [facility];  Service: Obstetrics     CHOLECYSTECTOMY       HC REMOVAL OF TONSILS,<13 Y/O      Description: Tonsillectomy;  Proc Date: 2008;  Comments: w/ adenoidectomy     LAPAROTOMY EXPLORATORY       OTHER SURGICAL HISTORY Left 2017     salpingectomyectopic pregnancy     PA LAP,DIAGNOSTIC ABDOMEN N/A 2/22/2017    Procedure: LAPAROSCOPY, LEFT SALPINGECTOMY;  Surgeon: Tucker Llamas MD;  Location: [facility];  Service: Gynecology     WISDOM TOOTH EXTRACTION  2002        Family History   Problem Relation Age of Onset     Skin Cancer No family hx of        Social History     Socioeconomic History     Marital status:      Spouse name: Not on file     Number of children: Not on file     Years of education: Not on file     Highest education level: Not on file   Occupational History     Not on file   Tobacco Use     Smoking status: Never     Smokeless tobacco: Never   Substance and Sexual Activity     Alcohol use: No     Drug use: No     Sexual activity: Not on file   Other Topics Concern     Not on file   Social History Narrative     Not on file     Social Determinants of Health     Financial Resource Strain: Not on file   Food Insecurity: Not on file   Transportation Needs: Not on file   Physical Activity: Not on file   Stress: Not on file   Social Connections: Not on file   Interpersonal Safety: Not on file   Housing Stability: Not on file       Outpatient Encounter Medications as of 9/21/2023   Medication Sig Dispense Refill     amphetamine-dextroamphetamine (ADDERALL XR) 10 MG 24 hr capsule Take 1 capsule (10 mg) by mouth daily 30 capsule 0     amphetamine-dextroamphetamine (ADDERALL XR) 20 MG 24 hr capsule Take 1 capsule (20 mg) by mouth daily 30 capsule 0     amphetamine-dextroamphetamine (ADDERALL XR) 30 MG 24 hr capsule Take 1 capsule (30 mg) by mouth daily 30 capsule 0     Clascoterone 1 % CREA Externally apply 1 Application topically 2 times daily To face 30 g 6     clindamycin (CLEOCIN T) 1 % external lotion Apply once daily in the morning. 60 mL 11     minoxidil (LONITEN) 2.5 MG tablet Take 1/2 tablet daily. 45 tablet 3     omeprazole (PRILOSEC) 20 MG capsule [OMEPRAZOLE (PRILOSEC) 20 MG CAPSULE] Take 20 mg by mouth daily  before breakfast.       Probiotic Product (PROBIOTIC-10) CHEW        VITAMIN D-VITAMIN K PO        doxycycline hyclate (VIBRAMYCIN) 100 MG capsule Take 1 capsule (100 mg) by mouth daily 30 capsule 2     prenatal vitamin iron-folic acid 27mg-0.8mg (PRENATAL S) 27 mg iron- 800 mcg Tab tablet [PRENATAL VITAMIN IRON-FOLIC ACID 27MG-0.8MG (PRENATAL S) 27 MG IRON- 800 MCG TAB TABLET] Take 1 tablet by mouth daily. (Patient not taking: Reported on 3/2/2023)       spironolactone (ALDACTONE) 50 MG tablet Take 1 tablet (50 mg) by mouth daily (Patient not taking: Reported on 9/21/2023) 30 tablet 5     tretinoin (RETIN-A) 0.025 % external cream Apply a pea-sized amount evenly over the face at nighttime before bed (Patient not taking: Reported on 9/21/2023) 45 g 11     [DISCONTINUED] clindamycin (CLEOCIN T) 1 % external lotion Apply once daily in the morning. (Patient not taking: Reported on 9/21/2023) 60 mL 11     Facility-Administered Encounter Medications as of 9/21/2023   Medication Dose Route Frequency Provider Last Rate Last Admin     medroxyPROGESTERone (DEPO-PROVERA) injection 150 mg  150 mg Intramuscular Q90 Days MoriJagdeep yañez MD   150 mg at 08/10/23 0935             O:   NAD, WDWN, Alert & Oriented, Mood & Affect wnl, Vitals stable   Here today alone   There were no vitals taken for this visit.   General appearance normal   Vitals stable   Alert, oriented and in no acute distress      Inflammatory papules, comedones on face    Nonscarring hair loss consistent with androgenetic alopecia      Eyes: Conjunctivae/lids:Normal     ENT: Lips: normal    MSK:Normal    Pulm: Breathing Normal    Neuro/Psych: Orientation:Alert and Orientedx3 ; Mood/Affect:normal     A/P:  1. Acne vulgaris/ Hirsutism.   Continue bpo wash.   Continue clindamycin in the morning.   Use daily moisturizer with sunscreen.   Apply differin at bedtime, alternate with winlevi. If not to trying can use together.   Apply miosturizer at bedtime.   It is  possible winlevi may help with hirsutism.   2. Androgenetic alopecia  Discussed treat is to preserve current hair.   She would like to try oral minoxidil 1.25 mg daily  Pictures taken.  Recheck in 6 months.       Again, thank you for allowing me to participate in the care of your patient.        Sincerely,        Vannesa Burks PA-C

## 2023-09-22 NOTE — PATIENT INSTRUCTIONS
Acne vulgaris/ Hirsutism.   Continue bpo wash.   Continue clindamycin in the morning.   Use daily moisturizer with sunscreen.   Apply differin at bedtime, alternate with winlevi. If not drying can try to use winlevi once or twice daily with other topicals   Apply miosturizer at bedtime.   It is possible winlevi may help with hirsutism. If not covered I will send in compounded spironolactone.   2. Androgenetic alopecia  Discussed treat is to preserve current hair.   She would like to try oral minoxidil 1.25 mg daily. Monitor blood of pressure  Pictures taken.  Recheck in 6 months.

## 2023-09-22 NOTE — PROGRESS NOTES
Lorraine Harris is an extremely pleasant 38 year old year old female patient here today for recheck acne, hair loss, and unwanted hair growth. She notes she stopped spironolactone since this caused dizziness. She notes she is using panoxyl and clindamycin lotion. She stopped tretinoin 0.025% cream since this caused dryness and irritation. She notes she has had thinning of hair throughout the years, her father has some thinning. She has tried nioxin without results. Patient has no other skin complaints today.  Remainder of the HPI, Meds, PMH, Allergies, FH, and SH was reviewed in chart.   Past Medical History:   Diagnosis Date    AMA (advanced maternal age) multigravida 35+ 2021    Anxiety     Depression     GERD (gastroesophageal reflux disease)     Mild or unspecified pre-eclampsia, unspecified as to episode of care     Created by Conversion     Pregnant state, incidental     Created by Conversion     Unspecified renal disease in pregnancy, unspecified as to episode of care(646.20)     Created by Conversion        Past Surgical History:   Procedure Laterality Date    ARTHROSCOPIC REPAIR ACL      and lateral release    C/SECTION, LOW TRANSVERSE       SECTION N/A 10/16/2018    Procedure: PRIMARY  SECTION;  Surgeon: Tucker Llamas MD;  Location: [facility];  Service:      SECTION N/A 6/15/2021    Procedure:  SECTION;  Surgeon: Dorcas Larios DO;  Location: [facility];  Service: Obstetrics    CHOLECYSTECTOMY      HC REMOVAL OF TONSILS,<11 Y/O      Description: Tonsillectomy;  Proc Date: 2008;  Comments: w/ adenoidectomy    LAPAROTOMY EXPLORATORY      OTHER SURGICAL HISTORY Left 2017    salpingectomyectopic pregnancy    CO LAP,DIAGNOSTIC ABDOMEN N/A 2017    Procedure: LAPAROSCOPY, LEFT SALPINGECTOMY;  Surgeon: Tucker Llamas MD;  Location: [facility];  Service: Gynecology    WISDOM TOOTH EXTRACTION          Family History    Problem Relation Age of Onset    Skin Cancer No family hx of        Social History     Socioeconomic History    Marital status:      Spouse name: Not on file    Number of children: Not on file    Years of education: Not on file    Highest education level: Not on file   Occupational History    Not on file   Tobacco Use    Smoking status: Never    Smokeless tobacco: Never   Substance and Sexual Activity    Alcohol use: No    Drug use: No    Sexual activity: Not on file   Other Topics Concern    Not on file   Social History Narrative    Not on file     Social Determinants of Health     Financial Resource Strain: Not on file   Food Insecurity: Not on file   Transportation Needs: Not on file   Physical Activity: Not on file   Stress: Not on file   Social Connections: Not on file   Interpersonal Safety: Not on file   Housing Stability: Not on file       Outpatient Encounter Medications as of 9/21/2023   Medication Sig Dispense Refill    amphetamine-dextroamphetamine (ADDERALL XR) 10 MG 24 hr capsule Take 1 capsule (10 mg) by mouth daily 30 capsule 0    amphetamine-dextroamphetamine (ADDERALL XR) 20 MG 24 hr capsule Take 1 capsule (20 mg) by mouth daily 30 capsule 0    amphetamine-dextroamphetamine (ADDERALL XR) 30 MG 24 hr capsule Take 1 capsule (30 mg) by mouth daily 30 capsule 0    Clascoterone 1 % CREA Externally apply 1 Application topically 2 times daily To face 30 g 6    clindamycin (CLEOCIN T) 1 % external lotion Apply once daily in the morning. 60 mL 11    minoxidil (LONITEN) 2.5 MG tablet Take 1/2 tablet daily. 45 tablet 3    omeprazole (PRILOSEC) 20 MG capsule [OMEPRAZOLE (PRILOSEC) 20 MG CAPSULE] Take 20 mg by mouth daily before breakfast.      Probiotic Product (PROBIOTIC-10) CHEW       VITAMIN D-VITAMIN K PO       doxycycline hyclate (VIBRAMYCIN) 100 MG capsule Take 1 capsule (100 mg) by mouth daily 30 capsule 2    prenatal vitamin iron-folic acid 27mg-0.8mg (PRENATAL S) 27 mg iron- 800 mcg Tab  tablet [PRENATAL VITAMIN IRON-FOLIC ACID 27MG-0.8MG (PRENATAL S) 27 MG IRON- 800 MCG TAB TABLET] Take 1 tablet by mouth daily. (Patient not taking: Reported on 3/2/2023)      spironolactone (ALDACTONE) 50 MG tablet Take 1 tablet (50 mg) by mouth daily (Patient not taking: Reported on 9/21/2023) 30 tablet 5    tretinoin (RETIN-A) 0.025 % external cream Apply a pea-sized amount evenly over the face at nighttime before bed (Patient not taking: Reported on 9/21/2023) 45 g 11    [DISCONTINUED] clindamycin (CLEOCIN T) 1 % external lotion Apply once daily in the morning. (Patient not taking: Reported on 9/21/2023) 60 mL 11     Facility-Administered Encounter Medications as of 9/21/2023   Medication Dose Route Frequency Provider Last Rate Last Admin    medroxyPROGESTERone (DEPO-PROVERA) injection 150 mg  150 mg Intramuscular Q90 Days Moriotilio, Jagdeep JEONG MD   150 mg at 08/10/23 0935             O:   NAD, WDWN, Alert & Oriented, Mood & Affect wnl, Vitals stable   Here today alone   There were no vitals taken for this visit.   General appearance normal   Vitals stable   Alert, oriented and in no acute distress      Inflammatory papules, comedones on face    Nonscarring hair loss consistent with androgenetic alopecia      Eyes: Conjunctivae/lids:Normal     ENT: Lips: normal    MSK:Normal    Pulm: Breathing Normal    Neuro/Psych: Orientation:Alert and Orientedx3 ; Mood/Affect:normal     A/P:  1. Acne vulgaris/ Hirsutism.   Continue bpo wash.   Continue clindamycin in the morning.   Use daily moisturizer with sunscreen.   Apply differin at bedtime, alternate with winlevi. If not to trying can use together.   Apply miosturizer at bedtime.   It is possible winlevi may help with hirsutism.   2. Androgenetic alopecia  Discussed treat is to preserve current hair.   She would like to try oral minoxidil 1.25 mg daily  Pictures taken.  Recheck in 6 months.

## 2023-10-12 ENCOUNTER — MYC REFILL (OUTPATIENT)
Dept: FAMILY MEDICINE | Facility: CLINIC | Age: 38
End: 2023-10-12
Payer: COMMERCIAL

## 2023-10-12 DIAGNOSIS — F90.2 ATTENTION DEFICIT HYPERACTIVITY DISORDER (ADHD), COMBINED TYPE: ICD-10-CM

## 2023-10-13 RX ORDER — DEXTROAMPHETAMINE SACCHARATE, AMPHETAMINE ASPARTATE MONOHYDRATE, DEXTROAMPHETAMINE SULFATE AND AMPHETAMINE SULFATE 7.5; 7.5; 7.5; 7.5 MG/1; MG/1; MG/1; MG/1
30 CAPSULE, EXTENDED RELEASE ORAL DAILY
Qty: 30 CAPSULE | Refills: 0 | Status: SHIPPED | OUTPATIENT
Start: 2023-10-13 | End: 2023-11-15

## 2023-10-14 ENCOUNTER — HEALTH MAINTENANCE LETTER (OUTPATIENT)
Age: 38
End: 2023-10-14

## 2023-10-27 ENCOUNTER — ALLIED HEALTH/NURSE VISIT (OUTPATIENT)
Dept: FAMILY MEDICINE | Facility: CLINIC | Age: 38
End: 2023-10-27
Payer: COMMERCIAL

## 2023-10-27 DIAGNOSIS — Z30.42 SURVEILLANCE FOR DEPO-PROVERA CONTRACEPTION: Primary | ICD-10-CM

## 2023-10-27 PROCEDURE — 99207 PR NO CHARGE NURSE ONLY: CPT

## 2023-10-27 PROCEDURE — 96372 THER/PROPH/DIAG INJ SC/IM: CPT | Performed by: FAMILY MEDICINE

## 2023-10-27 RX ADMIN — MEDROXYPROGESTERONE ACETATE 150 MG: 150 INJECTION, SUSPENSION INTRAMUSCULAR at 13:21

## 2023-10-27 NOTE — PROGRESS NOTES
Clinic Administered Medication Documentation      Depo Provera Documentation    Depo-Provera Standing Order inclusion/exclusion criteria reviewed.     Is this the initial or subsequent dose of Depo Provera? Subsequent dose - patient is within the acceptable window of time (11-15 weeks) for subsequent injection. Pregnancy test not indicated.    Patient meets: inclusion criteria     Is there an active order (written within the past 365 days, with administrations remaining, not ) in the chart? Yes.     Prior to injection, verified patient identity using patient's name and date of birth. Medication was administered. Please see MAR and medication order for additional information.     Vial/Syringe: Single dose vial. Was entire vial of medication used? Yes    Patient instructed to remain in clinic for 15 minutes and report any adverse reaction to staff immediately but patient declined.  NEXT INJECTION DUE: 24 - 24    Verified that the patient has refills remaining in their prescription.    Laura Alas, CMA

## 2023-11-09 ENCOUNTER — TRANSFERRED RECORDS (OUTPATIENT)
Dept: HEALTH INFORMATION MANAGEMENT | Facility: CLINIC | Age: 38
End: 2023-11-09
Payer: COMMERCIAL

## 2023-11-15 ENCOUNTER — MYC REFILL (OUTPATIENT)
Dept: FAMILY MEDICINE | Facility: CLINIC | Age: 38
End: 2023-11-15
Payer: COMMERCIAL

## 2023-11-15 DIAGNOSIS — F90.2 ATTENTION DEFICIT HYPERACTIVITY DISORDER (ADHD), COMBINED TYPE: ICD-10-CM

## 2023-11-15 RX ORDER — DEXTROAMPHETAMINE SACCHARATE, AMPHETAMINE ASPARTATE MONOHYDRATE, DEXTROAMPHETAMINE SULFATE AND AMPHETAMINE SULFATE 7.5; 7.5; 7.5; 7.5 MG/1; MG/1; MG/1; MG/1
30 CAPSULE, EXTENDED RELEASE ORAL DAILY
Qty: 30 CAPSULE | Refills: 0 | Status: SHIPPED | OUTPATIENT
Start: 2023-11-15 | End: 2023-12-18

## 2023-11-17 ENCOUNTER — VIRTUAL VISIT (OUTPATIENT)
Dept: FAMILY MEDICINE | Facility: CLINIC | Age: 38
End: 2023-11-17
Payer: COMMERCIAL

## 2023-11-17 DIAGNOSIS — J20.9 BRONCHITIS WITH BRONCHOSPASM: Primary | ICD-10-CM

## 2023-11-17 PROCEDURE — 99213 OFFICE O/P EST LOW 20 MIN: CPT | Mod: VID | Performed by: PHYSICIAN ASSISTANT

## 2023-11-17 RX ORDER — AZITHROMYCIN 250 MG/1
TABLET, FILM COATED ORAL
Qty: 6 TABLET | Refills: 0 | Status: SHIPPED | OUTPATIENT
Start: 2023-11-17 | End: 2023-11-22

## 2023-11-17 RX ORDER — PREDNISONE 20 MG/1
40 TABLET ORAL DAILY
Qty: 10 TABLET | Refills: 0 | Status: SHIPPED | OUTPATIENT
Start: 2023-11-17 | End: 2023-11-22

## 2023-11-17 RX ORDER — BENZONATATE 200 MG/1
200 CAPSULE ORAL 3 TIMES DAILY PRN
Qty: 30 CAPSULE | Refills: 0 | Status: SHIPPED | OUTPATIENT
Start: 2023-11-17

## 2023-11-17 RX ORDER — ALBUTEROL SULFATE 90 UG/1
2 AEROSOL, METERED RESPIRATORY (INHALATION) EVERY 6 HOURS PRN
Qty: 18 G | Refills: 0 | Status: SHIPPED | OUTPATIENT
Start: 2023-11-17

## 2023-11-17 NOTE — PROGRESS NOTES
Lorraine is a 38 year old who is being evaluated via a billable video visit.      How would you like to obtain your AVS? MyChart  If the video visit is dropped, the invitation should be resent by: Text to cell phone: [phone]  Will anyone else be joining your video visit? No          Assessment & Plan       ICD-10-CM    1. Bronchitis with bronchospasm  J20.9 predniSONE (DELTASONE) 20 MG tablet     azithromycin (ZITHROMAX) 250 MG tablet     albuterol (PROAIR HFA/PROVENTIL HFA/VENTOLIN HFA) 108 (90 Base) MCG/ACT inhaler     benzonatate (TESSALON) 200 MG capsule        Due to duration of symptoms and severity, as well as her history of pneumonia will treat with prednisone, antibiotics, and albuterol. Did discuss could be viral. Discussed symptomatic measures and signs to be seen urgently.    Lonra Chino PA-C  [facility]    Leny Peters is a 38 year old, presenting for the following health issues:  URI    HPI     cough    Cough for several weeks.  Went to [facility] last week for coughing until she gags. Using albuterol and tessalon. Can't get full breath. Negative strep.  Leave for [place] next Saturday. 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   Some sinus pressure.     Review of Systems   Other than noted above, general, HEENT, respiratory, cardiac, MS, and gastrointestinal systems are negative.       Objective           Vitals:  No vitals were obtained today due to virtual visit.    Physical Exam   GENERAL: Healthy, alert and no distress  EYES: Eyes grossly normal to inspection.  No discharge or erythema, or obvious scleral/conjunctival abnormalities.  RESP: No audible wheeze, cough, or visible cyanosis.  No visible retractions or increased work of breathing.    SKIN: Visible skin clear. No significant rash, abnormal pigmentation or lesions.  NEURO: Cranial nerves grossly intact.  Mentation and speech appropriate for age.  PSYCH:  Mentation appears normal, affect normal/bright, judgement and insight intact, normal speech and appearance well-groomed.        Video-Visit Details    Type of service:  Video Visit     Originating Location (pt. Location): Home    Distant Location (provider location):  On-site  Platform used for Video Visit: [employer]

## 2023-12-18 ENCOUNTER — MYC REFILL (OUTPATIENT)
Dept: FAMILY MEDICINE | Facility: CLINIC | Age: 38
End: 2023-12-18
Payer: COMMERCIAL

## 2023-12-18 DIAGNOSIS — F90.2 ATTENTION DEFICIT HYPERACTIVITY DISORDER (ADHD), COMBINED TYPE: ICD-10-CM

## 2023-12-18 RX ORDER — DEXTROAMPHETAMINE SACCHARATE, AMPHETAMINE ASPARTATE MONOHYDRATE, DEXTROAMPHETAMINE SULFATE AND AMPHETAMINE SULFATE 7.5; 7.5; 7.5; 7.5 MG/1; MG/1; MG/1; MG/1
30 CAPSULE, EXTENDED RELEASE ORAL DAILY
Qty: 30 CAPSULE | Refills: 0 | Status: SHIPPED | OUTPATIENT
Start: 2023-12-18 | End: 2024-01-17

## 2024-01-14 ENCOUNTER — HOSPITAL ENCOUNTER (EMERGENCY)
Facility: HOSPITAL | Age: 39
Discharge: HOME | End: 2024-01-14
Attending: EMERGENCY MEDICINE | Admitting: EMERGENCY MEDICINE
Payer: COMMERCIAL

## 2024-01-14 ENCOUNTER — APPOINTMENT (OUTPATIENT)
Dept: MRI IMAGING | Facility: HOSPITAL | Age: 39
End: 2024-01-14
Attending: EMERGENCY MEDICINE
Payer: COMMERCIAL

## 2024-01-14 ENCOUNTER — APPOINTMENT (OUTPATIENT)
Dept: RADIOLOGY | Facility: HOSPITAL | Age: 39
End: 2024-01-14
Attending: EMERGENCY MEDICINE
Payer: COMMERCIAL

## 2024-01-14 ENCOUNTER — APPOINTMENT (OUTPATIENT)
Dept: CT IMAGING | Facility: HOSPITAL | Age: 39
End: 2024-01-14
Attending: EMERGENCY MEDICINE
Payer: COMMERCIAL

## 2024-01-14 VITALS
BODY MASS INDEX: 30.12 KG/M2 | SYSTOLIC BLOOD PRESSURE: 137 MMHG | DIASTOLIC BLOOD PRESSURE: 77 MMHG | HEART RATE: 84 BPM | HEIGHT: 63 IN | OXYGEN SATURATION: 100 % | RESPIRATION RATE: 22 BRPM | TEMPERATURE: 97.5 F | WEIGHT: 170 LBS
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
## 2024-01-14 DIAGNOSIS — R91.1 NODULE OF LEFT LUNG: ICD-10-CM

## 2024-01-14 DIAGNOSIS — S16.1XXA CERVICAL STRAIN, INITIAL ENCOUNTER: ICD-10-CM
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
## 2024-01-14 DIAGNOSIS — R79.89 ELEVATED PLATELET COUNT: ICD-10-CM

## 2024-01-14 DIAGNOSIS — S20.212A CHEST WALL CONTUSION, LEFT, INITIAL ENCOUNTER: ICD-10-CM

## 2024-01-14 LAB
ANION GAP SERPL CALCULATED.3IONS-SCNC: 13 MMOL/L (ref 7–15)
BASOPHILS # BLD AUTO: 0.1 10E3/UL (ref 0–0.2)
BASOPHILS NFR BLD AUTO: 1 %
BUN SERPL-MCNC: 10.8 MG/DL (ref 6–20)
CALCIUM SERPL-MCNC: 9.7 MG/DL (ref 8.6–10)
CHLORIDE SERPL-SCNC: 105 MMOL/L (ref 98–107)
CREAT SERPL-MCNC: 0.78 MG/DL (ref 0.51–0.95)
DEPRECATED HCO3 PLAS-SCNC: 24 MMOL/L (ref 22–29)
EGFRCR SERPLBLD CKD-EPI 2021: >90 ML/MIN/1.73M2
EOSINOPHIL # BLD AUTO: 0.1 10E3/UL (ref 0–0.7)
EOSINOPHIL NFR BLD AUTO: 2 %
ERYTHROCYTE [DISTWIDTH] IN BLOOD BY AUTOMATED COUNT: 12.5 % (ref 10–15)
GLUCOSE SERPL-MCNC: 109 MG/DL (ref 70–99)
HCG UR QL: NEGATIVE
HCT VFR BLD AUTO: 42.1 % (ref 35–47)
HGB BLD-MCNC: 13.4 G/DL (ref 11.7–15.7)
HOLD SPECIMEN: NORMAL
HOLD SPECIMEN: NORMAL
IMM GRANULOCYTES # BLD: 0 10E3/UL
IMM GRANULOCYTES NFR BLD: 1 %
LYMPHOCYTES # BLD AUTO: 2.1 10E3/UL (ref 0.8–5.3)
LYMPHOCYTES NFR BLD AUTO: 29 %
MCH RBC QN AUTO: 27.3 PG (ref 26.5–33)
MCHC RBC AUTO-ENTMCNC: 31.8 G/DL (ref 31.5–36.5)
MCV RBC AUTO: 86 FL (ref 78–100)
MONOCYTES # BLD AUTO: 0.4 10E3/UL (ref 0–1.3)
MONOCYTES NFR BLD AUTO: 6 %
NEUTROPHILS # BLD AUTO: 4.4 10E3/UL (ref 1.6–8.3)
NEUTROPHILS NFR BLD AUTO: 61 %
NRBC # BLD AUTO: 0 10E3/UL
NRBC BLD AUTO-RTO: 0 /100
PLATELET # BLD AUTO: 549 10E3/UL (ref 150–450)
POTASSIUM SERPL-SCNC: 4.1 MMOL/L (ref 3.4–5.3)
RBC # BLD AUTO: 4.9 10E6/UL (ref 3.8–5.2)
SODIUM SERPL-SCNC: 142 MMOL/L (ref 135–145)
WBC # BLD AUTO: 7.1 10E3/UL (ref 4–11)

## 2024-01-14 PROCEDURE — 70450 CT HEAD/BRAIN W/O DYE: CPT

## 2024-01-14 PROCEDURE — 36415 COLL VENOUS BLD VENIPUNCTURE: CPT | Performed by: EMERGENCY MEDICINE

## 2024-01-14 PROCEDURE — 99285 EMERGENCY DEPT VISIT HI MDM: CPT | Mod: 25

## 2024-01-14 PROCEDURE — 96374 THER/PROPH/DIAG INJ IV PUSH: CPT

## 2024-01-14 PROCEDURE — 85025 COMPLETE CBC W/AUTO DIFF WBC: CPT | Performed by: EMERGENCY MEDICINE

## 2024-01-14 PROCEDURE — 258N000003 HC RX IP 258 OP 636: Performed by: EMERGENCY MEDICINE

## 2024-01-14 PROCEDURE — 72148 MRI LUMBAR SPINE W/O DYE: CPT

## 2024-01-14 PROCEDURE — 250N000011 HC RX IP 250 OP 636: Performed by: EMERGENCY MEDICINE

## 2024-01-14 PROCEDURE — 74174 CTA ABD&PLVS W/CONTRAST: CPT

## 2024-01-14 PROCEDURE — 72146 MRI CHEST SPINE W/O DYE: CPT

## 2024-01-14 PROCEDURE — 96361 HYDRATE IV INFUSION ADD-ON: CPT

## 2024-01-14 PROCEDURE — 73630 X-RAY EXAM OF FOOT: CPT | Mod: RT

## 2024-01-14 PROCEDURE — 81025 URINE PREGNANCY TEST: CPT | Performed by: EMERGENCY MEDICINE

## 2024-01-14 PROCEDURE — 999N000104 CT LUMBAR SPINE RECONSTRUCTED

## 2024-01-14 PROCEDURE — 250N000013 HC RX MED GY IP 250 OP 250 PS 637: Performed by: EMERGENCY MEDICINE

## 2024-01-14 PROCEDURE — 72141 MRI NECK SPINE W/O DYE: CPT

## 2024-01-14 PROCEDURE — 80048 BASIC METABOLIC PNL TOTAL CA: CPT | Performed by: EMERGENCY MEDICINE

## 2024-01-14 PROCEDURE — 99221 1ST HOSP IP/OBS SF/LOW 40: CPT

## 2024-01-14 PROCEDURE — 72125 CT NECK SPINE W/O DYE: CPT

## 2024-01-14 PROCEDURE — 999N000104 CT THORACIC SPINE RECONSTRUCTED

## 2024-01-14 PROCEDURE — 73030 X-RAY EXAM OF SHOULDER: CPT | Mod: RT

## 2024-01-14 PROCEDURE — 73610 X-RAY EXAM OF ANKLE: CPT | Mod: RT

## 2024-01-14 RX ORDER — KETOROLAC TROMETHAMINE 15 MG/ML
15 INJECTION, SOLUTION INTRAMUSCULAR; INTRAVENOUS ONCE
Status: DISCONTINUED | OUTPATIENT
Start: 2024-01-14 | End: 2024-01-14

## 2024-01-14 RX ORDER — OXYCODONE HYDROCHLORIDE 5 MG/1
5 TABLET ORAL ONCE
Status: COMPLETED | OUTPATIENT
Start: 2024-01-14 | End: 2024-01-14

## 2024-01-14 RX ORDER — KETOROLAC TROMETHAMINE 15 MG/ML
15 INJECTION, SOLUTION INTRAMUSCULAR; INTRAVENOUS ONCE
Status: COMPLETED | OUTPATIENT
Start: 2024-01-14 | End: 2024-01-14

## 2024-01-14 RX ORDER — ACETAMINOPHEN 500 MG
1000 TABLET ORAL ONCE
Status: COMPLETED | OUTPATIENT
Start: 2024-01-14 | End: 2024-01-14

## 2024-01-14 RX ORDER — IOPAMIDOL 755 MG/ML
90 INJECTION, SOLUTION INTRAVASCULAR ONCE
Status: COMPLETED | OUTPATIENT
Start: 2024-01-14 | End: 2024-01-14

## 2024-01-14 RX ORDER — METHOCARBAMOL 500 MG/1
750 TABLET, FILM COATED ORAL 3 TIMES DAILY
Qty: 20 TABLET | Refills: 0 | Status: SHIPPED | OUTPATIENT
Start: 2024-01-14 | End: 2024-01-23

## 2024-01-14 RX ADMIN — ACETAMINOPHEN 1000 MG: 500 TABLET ORAL at 12:26

## 2024-01-14 RX ADMIN — OXYCODONE HYDROCHLORIDE 5 MG: 5 TABLET ORAL at 12:25

## 2024-01-14 RX ADMIN — KETOROLAC TROMETHAMINE 15 MG: 15 INJECTION, SOLUTION INTRAMUSCULAR; INTRAVENOUS at 08:31

## 2024-01-14 RX ADMIN — SODIUM CHLORIDE 500 ML: 9 INJECTION, SOLUTION INTRAVENOUS at 09:50

## 2024-01-14 RX ADMIN — IOPAMIDOL 90 ML: 755 INJECTION, SOLUTION INTRAVENOUS at 09:09

## 2024-01-14 ASSESSMENT — ACTIVITIES OF DAILY LIVING (ADL)
ADLS_ACUITY_SCORE: 35

## 2024-01-14 ASSESSMENT — ENCOUNTER SYMPTOMS: NECK PAIN: 1
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
## 2024-01-14 NOTE — Clinical Note
Lorraine Harris was seen and treated in our emergency department on 1/14/2024.  She may return to work on 01/22/2024.       If you have any questions or concerns, please don't hesitate to call.      Katelin Skelton MD

## 2024-01-14 NOTE — DISCHARGE INSTRUCTIONS
Read and follow the discharge instructions.    Thank you for your patience.    Your evaluation included a CT of your head, CT of your cervical, thoracic, and lumbar spine.  There were no bleeding and no broken bones.    Your evaluation also included a CT of your chest and abdomen.  There were no broken bones.  No bleeding or bruising internally.    Because you experience numbness of your right hand an MRI of the cervical spine was done that reveals no spinal cord injury.    Because you developed a burning sensation and pain in your back while on MRI your thoracic and lumbar spine MRIs were also done.  No evidence of cord injury.    X-ray of your shoulder and ankle were normal.  But the x-ray of your foot shows a little area in the calcaneus the big bone of the foot that they might be an avulsion fracture.  Call [facility] tomorrow to make a follow-up appointment.    You are going to be sore continue the Tylenol 500 mg every 6 hours for pain  Continue ibuprofen 400 mg every 6 hours take it with food.  Take the Robaxin as instructed do not drive or drink alcohol while taking this medication because he may make you lightheaded.    You are likely to feel more sore at the days go by.  I recommend you call your primary care doctor tomorrow to make a follow-up reevaluation for the upcoming week.    Return for worsening symptoms or any other concerns.

## 2024-01-15 ENCOUNTER — TELEPHONE (OUTPATIENT)
Dept: NEUROSURGERY | Facility: CLINIC | Age: 39
End: 2024-01-15
Payer: COMMERCIAL

## 2024-01-15 NOTE — TELEPHONE ENCOUNTER
PATIENT NAME:  Lorraine Harris  YOB: 1985  MRN: [id number]  SURGEON: Dr. Bro  DATE of CONSULT: 01/14/2024  Consult for cervical stenosis    FOLLOW-UP PLAN:    Hospital Follow Up Visit: 4 weeks  Provider: BOOKER    DIAGNOSTICS: XR  DISPOSITION: Home same day    ADDITIONAL INSTRUCTIONS FOR MEDICAL STAFF:    Diana Zamora RN, CNRN

## 2024-01-17 ENCOUNTER — MYC REFILL (OUTPATIENT)
Dept: FAMILY MEDICINE | Facility: CLINIC | Age: 39
End: 2024-01-17
Payer: COMMERCIAL

## 2024-01-17 ENCOUNTER — TELEPHONE (OUTPATIENT)
Dept: FAMILY MEDICINE | Facility: CLINIC | Age: 39
End: 2024-01-17
Payer: COMMERCIAL

## 2024-01-17 DIAGNOSIS — F90.2 ATTENTION DEFICIT HYPERACTIVITY DISORDER (ADHD), COMBINED TYPE: ICD-10-CM

## 2024-01-17 RX ORDER — DEXTROAMPHETAMINE SACCHARATE, AMPHETAMINE ASPARTATE MONOHYDRATE, DEXTROAMPHETAMINE SULFATE AND AMPHETAMINE SULFATE 7.5; 7.5; 7.5; 7.5 MG/1; MG/1; MG/1; MG/1
30 CAPSULE, EXTENDED RELEASE ORAL DAILY
Qty: 30 CAPSULE | Refills: 0 | Status: SHIPPED | OUTPATIENT
Start: 2024-01-17 | End: 2024-02-19

## 2024-01-17 NOTE — TELEPHONE ENCOUNTER
Reason for Call:  Appointment Request    Patient requesting this type of appt:  Hospital/ED Follow-Up     Requested provider: Jeannie Armando    Reason patient unable to be scheduled:  Hold spots on date requested by pt are not long enough    When does patient want to be seen/preferred time:  Pt would like to be seen on Jan 23rd, would prefer PCP        Could we send this information to you in [facility] or would you prefer to receive a phone call?:   Patient would prefer a phone call   Okay to leave a detailed message?: Yes at Home number on file [phone] (home)    Call taken on 1/17/2024 at 9:47 AM by Ivania Oconnor

## 2024-01-18 ENCOUNTER — ALLIED HEALTH/NURSE VISIT (OUTPATIENT)
Dept: FAMILY MEDICINE | Facility: CLINIC | Age: 39
End: 2024-01-18
Payer: COMMERCIAL

## 2024-01-18 DIAGNOSIS — Z30.42 DEPO-PROVERA CONTRACEPTIVE STATUS: Primary | ICD-10-CM

## 2024-01-18 PROCEDURE — 96372 THER/PROPH/DIAG INJ SC/IM: CPT | Performed by: NURSE PRACTITIONER

## 2024-01-18 RX ORDER — MEDROXYPROGESTERONE ACETATE 150 MG/ML
150 INJECTION, SUSPENSION INTRAMUSCULAR
Status: COMPLETED | OUTPATIENT
Start: 2024-01-18 | End: 2024-09-13

## 2024-01-18 RX ADMIN — MEDROXYPROGESTERONE ACETATE 150 MG: 150 INJECTION, SUSPENSION INTRAMUSCULAR at 11:35

## 2024-01-18 NOTE — PROGRESS NOTES
Depo-Provera orders have been placed.    Karen Schmitt, APRN, CNP    Clinic Administered Medication Documentation      Depo Provera Documentation    Depo-Provera Standing Order inclusion/exclusion criteria reviewed.     Is this the initial or subsequent dose of Depo Provera? Subsequent dose - patient is within the acceptable window of time (11-15 weeks) for subsequent injection. Pregnancy test not indicated.    Patient meets: inclusion criteria     Is there an active order (written within the past 365 days, with administrations remaining, not ) in the chart? Yes.     Prior to injection, verified patient identity using patient's name and date of birth. Medication was administered. Please see MAR and medication order for additional information.     Vial/Syringe: Single dose vial. Was entire vial of medication used? Yes    Patient instructed to remain in clinic for 15 minutes and report any adverse reaction to staff immediately.  NEXT INJECTION DUE: 24 - 24    Verified that the patient has refills remaining in their prescription.   Yuliya Murray CMA

## 2024-01-23 ENCOUNTER — OFFICE VISIT (OUTPATIENT)
Dept: FAMILY MEDICINE | Facility: CLINIC | Age: 39
End: 2024-01-23
Payer: COMMERCIAL

## 2024-01-23 VITALS
HEART RATE: 88 BPM | BODY MASS INDEX: 30.11 KG/M2 | DIASTOLIC BLOOD PRESSURE: 86 MMHG | RESPIRATION RATE: 18 BRPM | SYSTOLIC BLOOD PRESSURE: 131 MMHG | OXYGEN SATURATION: 99 % | HEIGHT: 63 IN
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
## 2024-01-23 PROCEDURE — 99213 OFFICE O/P EST LOW 20 MIN: CPT | Performed by: FAMILY MEDICINE

## 2024-01-23 RX ORDER — METHOCARBAMOL 500 MG/1
750 TABLET, FILM COATED ORAL 3 TIMES DAILY
Qty: 20 TABLET | Refills: 0 | Status: SHIPPED | OUTPATIENT
Start: 2024-01-23
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
## 2024-02-06 ENCOUNTER — TELEPHONE (OUTPATIENT)
Dept: FAMILY MEDICINE | Facility: CLINIC | Age: 39
End: 2024-02-06

## 2024-02-13 ENCOUNTER — TELEPHONE (OUTPATIENT)
Dept: DERMATOLOGY | Facility: CLINIC | Age: 39
End: 2024-02-13
Payer: COMMERCIAL

## 2024-02-13 DIAGNOSIS — L70.0 ACNE VULGARIS: Primary | ICD-10-CM

## 2024-02-13 NOTE — TELEPHONE ENCOUNTER
[facility] Call Center    Phone Message    May a detailed message be left on voicemail: yes     Reason for Call: Other: Pt states she is returning a call from [facility]. Please call Pt back. Thank you.      Action Taken: Other: [facility] [facility]    Travel Screening: Not Applicable

## 2024-02-14 ENCOUNTER — OFFICE VISIT (OUTPATIENT)
Dept: FAMILY MEDICINE | Facility: CLINIC | Age: 39
End: 2024-02-14
Payer: COMMERCIAL

## 2024-02-14 VITALS
HEART RATE: 107 BPM | OXYGEN SATURATION: 98 % | RESPIRATION RATE: 16 BRPM | HEIGHT: 63 IN | WEIGHT: 186.2 LBS | DIASTOLIC BLOOD PRESSURE: 85 MMHG | SYSTOLIC BLOOD PRESSURE: 120 MMHG | BODY MASS INDEX: 32.99 KG/M2
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
## 2024-02-14 PROCEDURE — 99213 OFFICE O/P EST LOW 20 MIN: CPT | Performed by: FAMILY MEDICINE

## 2024-02-14 NOTE — TELEPHONE ENCOUNTER
Sent in compounded spironolactone this goes to [facility], $35 out of pocket.

## 2024-02-14 NOTE — TELEPHONE ENCOUNTER
Spoke to patient and advisedof Vannesa's note below. I also gave her the pharmacy phone number as well. Kenyatta Negron RN
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
## 2024-02-19 ENCOUNTER — OFFICE VISIT (OUTPATIENT)
Dept: NEUROSURGERY | Facility: CLINIC | Age: 39
End: 2024-02-19
Payer: COMMERCIAL

## 2024-02-19 ENCOUNTER — HOSPITAL ENCOUNTER (OUTPATIENT)
Dept: GENERAL RADIOLOGY | Facility: HOSPITAL | Age: 39
Discharge: HOME | End: 2024-02-19
Payer: COMMERCIAL

## 2024-02-19 ENCOUNTER — MYC MEDICAL ADVICE (OUTPATIENT)
Dept: FAMILY MEDICINE | Facility: CLINIC | Age: 39
End: 2024-02-19
Payer: COMMERCIAL

## 2024-02-19 ENCOUNTER — MYC REFILL (OUTPATIENT)
Dept: FAMILY MEDICINE | Facility: CLINIC | Age: 39
End: 2024-02-19
Payer: COMMERCIAL

## 2024-02-19 ENCOUNTER — TELEPHONE (OUTPATIENT)
Dept: FAMILY MEDICINE | Facility: CLINIC | Age: 39
End: 2024-02-19
Payer: COMMERCIAL

## 2024-02-19 VITALS
WEIGHT: 186 LBS | DIASTOLIC BLOOD PRESSURE: 71 MMHG | HEART RATE: 98 BPM | BODY MASS INDEX: 32.96 KG/M2 | OXYGEN SATURATION: 99 % | SYSTOLIC BLOOD PRESSURE: 131 MMHG | HEIGHT: 63 IN

## 2024-02-19 DIAGNOSIS — F90.2 ATTENTION DEFICIT HYPERACTIVITY DISORDER (ADHD), COMBINED TYPE: Primary | ICD-10-CM

## 2024-02-19 DIAGNOSIS — M54.2 NECK PAIN: Primary | ICD-10-CM

## 2024-02-19 DIAGNOSIS — L70.9 ACNE, UNSPECIFIED ACNE TYPE: ICD-10-CM

## 2024-02-19 DIAGNOSIS — F90.2 ATTENTION DEFICIT HYPERACTIVITY DISORDER (ADHD), COMBINED TYPE: ICD-10-CM
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
## 2024-02-19 PROCEDURE — 99203 OFFICE O/P NEW LOW 30 MIN: CPT

## 2024-02-19 PROCEDURE — 72050 X-RAY EXAM NECK SPINE 4/5VWS: CPT

## 2024-02-19 RX ORDER — GABAPENTIN 100 MG/1
100 CAPSULE ORAL AT BEDTIME
COMMUNITY

## 2024-02-19 RX ORDER — DEXTROAMPHETAMINE SACCHARATE, AMPHETAMINE ASPARTATE MONOHYDRATE, DEXTROAMPHETAMINE SULFATE AND AMPHETAMINE SULFATE 7.5; 7.5; 7.5; 7.5 MG/1; MG/1; MG/1; MG/1
30 CAPSULE, EXTENDED RELEASE ORAL DAILY
Qty: 30 CAPSULE | Refills: 0 | Status: SHIPPED | OUTPATIENT
Start: 2024-02-19 | End: 2024-03-21

## 2024-02-19 RX ORDER — CLINDAMYCIN PHOSPHATE 10 UG/ML
LOTION TOPICAL
Qty: 60 ML | Refills: 6 | Status: SHIPPED | OUTPATIENT
Start: 2024-02-19

## 2024-02-19 RX ORDER — DEXTROAMPHETAMINE SACCHARATE, AMPHETAMINE ASPARTATE, DEXTROAMPHETAMINE SULFATE AND AMPHETAMINE SULFATE 5; 5; 5; 5 MG/1; MG/1; MG/1; MG/1
20 TABLET ORAL 2 TIMES DAILY
Qty: 30 TABLET | Refills: 0 | Status: SHIPPED | OUTPATIENT
Start: 2024-02-19 | End: 2024-02-20

## 2024-02-19 RX ORDER — DEXTROAMPHETAMINE SACCHARATE, AMPHETAMINE ASPARTATE MONOHYDRATE, DEXTROAMPHETAMINE SULFATE AND AMPHETAMINE SULFATE 2.5; 2.5; 2.5; 2.5 MG/1; MG/1; MG/1; MG/1
10 CAPSULE, EXTENDED RELEASE ORAL DAILY
Qty: 30 CAPSULE | Refills: 0 | Status: SHIPPED | OUTPATIENT
Start: 2024-02-19

## 2024-02-19 ASSESSMENT — PAIN SCALES - GENERAL: PAINLEVEL: MILD PAIN (3)

## 2024-02-19 NOTE — TELEPHONE ENCOUNTER
[facility] called requesting Dr. Armando to send prescription for Addreall XR 20 mg. They received the Addreall 20 mg instead the XR. Please advise
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
## 2024-02-19 NOTE — TELEPHONE ENCOUNTER
Requested Prescriptions   Signed Prescriptions Disp Refills    clindamycin (CLEOCIN T) 1 % external lotion 60 mL 6     Sig: Apply once daily in the morning.       There is no refill protocol information for this order        Pt still had refills and changed to different pharmacy.  Alem MAY RN BSN PHN  Specialty Clinics

## 2024-02-20 RX ORDER — DEXTROAMPHETAMINE SACCHARATE, AMPHETAMINE ASPARTATE MONOHYDRATE, DEXTROAMPHETAMINE SULFATE AND AMPHETAMINE SULFATE 5; 5; 5; 5 MG/1; MG/1; MG/1; MG/1
20 CAPSULE, EXTENDED RELEASE ORAL DAILY
Qty: 30 CAPSULE | Refills: 0 | Status: SHIPPED | OUTPATIENT
Start: 2024-02-20

## 2024-03-21 ENCOUNTER — MYC REFILL (OUTPATIENT)
Dept: FAMILY MEDICINE | Facility: CLINIC | Age: 39
End: 2024-03-21
Payer: COMMERCIAL

## 2024-03-21 DIAGNOSIS — F90.2 ATTENTION DEFICIT HYPERACTIVITY DISORDER (ADHD), COMBINED TYPE: ICD-10-CM

## 2024-03-21 RX ORDER — DEXTROAMPHETAMINE SACCHARATE, AMPHETAMINE ASPARTATE MONOHYDRATE, DEXTROAMPHETAMINE SULFATE AND AMPHETAMINE SULFATE 7.5; 7.5; 7.5; 7.5 MG/1; MG/1; MG/1; MG/1
30 CAPSULE, EXTENDED RELEASE ORAL DAILY
Qty: 30 CAPSULE | Refills: 0 | Status: SHIPPED | OUTPATIENT
Start: 2024-03-21 | End: 2024-03-22

## 2024-03-22 RX ORDER — DEXTROAMPHETAMINE SACCHARATE, AMPHETAMINE ASPARTATE MONOHYDRATE, DEXTROAMPHETAMINE SULFATE AND AMPHETAMINE SULFATE 7.5; 7.5; 7.5; 7.5 MG/1; MG/1; MG/1; MG/1
30 CAPSULE, EXTENDED RELEASE ORAL DAILY
Qty: 30 CAPSULE | Refills: 0 | Status: SHIPPED | OUTPATIENT
Start: 2024-03-22 | End: 2024-04-16

## 2024-03-22 NOTE — TELEPHONE ENCOUNTER
Medication Question or Refill    Contacts         Type Contact Phone/Fax    02/06/2024 09:01 AM CST Phone (Incoming) Lorraien Harris (Self) [phone] (M)    03/22/2024 09:15 AM CDT Phone (Incoming) Lorraine Harris (Self) [phone] (M)            What medication are you calling about (include dose and sig)?: amphetamine-dextroamphetamine (ADDERALL XR) 30 MG 24 hr capsule     Preferred Pharmacy:   [id number] - [place] [address] [address] & [place] [address] [address]  [facility] [address]  Phone: [phone] Fax: [phone]      Controlled Substance Agreement on file:   CSA -- Patient Level:     [Media Unavailable] Controlled Substance Agreement - Non - Opioid - Scan on 4/28/2022 12:20 PM: NON-OPIOID CONTROLLED SUBSTANCE AGREEMENT       Who prescribed the medication?: Jeannie Armando    Do you need a refill? Yes, Pharmacy change. [facility] in [place] does not have med in stock, please send to [facility] in [place]      Could we send this information to you in [employer] or would you prefer to receive a phone call?:   Patient would like to be contacted via [employer]

## 2024-03-22 NOTE — TELEPHONE ENCOUNTER
What medication are you calling about (include dose and sig)?: amphetamine-dextroamphetamine (ADDERALL XR) 30 MG 24 hr capsule      Preferred Pharmacy:   [id number] - [place] [address] [address] & [place] [address] [address]  [facility] [address]  Phone: [phone] Fax: [phone]        Controlled Substance Agreement on file:   CSA -- Patient Level:     [Media Unavailable] Controlled Substance Agreement - Non - Opioid - Scan on 4/28/2022 12:20 PM: NON-OPIOID CONTROLLED SUBSTANCE AGREEMENT         Who prescribed the medication?: Jeannie Armando     Do you need a refill? Yes, Pharmacy change. [facility] in [place] does not have med in stock, please send to [facility] in [place]        Could we send this information to you in [employer] or would you prefer to receive a phone call?:   Patient would like to be contacted via [employer]

## 2024-04-05 ENCOUNTER — ALLIED HEALTH/NURSE VISIT (OUTPATIENT)
Dept: FAMILY MEDICINE | Facility: CLINIC | Age: 39
End: 2024-04-05
Payer: COMMERCIAL

## 2024-04-05 DIAGNOSIS — Z30.42 ENCOUNTER FOR SURVEILLANCE OF INJECTABLE CONTRACEPTIVE: Primary | ICD-10-CM

## 2024-04-05 PROCEDURE — 96372 THER/PROPH/DIAG INJ SC/IM: CPT | Performed by: NURSE PRACTITIONER

## 2024-04-05 RX ADMIN — MEDROXYPROGESTERONE ACETATE 150 MG: 150 INJECTION, SUSPENSION INTRAMUSCULAR at 09:36

## 2024-04-05 NOTE — PROGRESS NOTES
Clinic Administered Medication Documentation      Depo Provera Documentation    Depo-Provera Standing Order inclusion/exclusion criteria reviewed.     Is this the initial or subsequent dose of Depo Provera? Subsequent dose - patient is within the acceptable window of time (11-15 weeks) for subsequent injection. Pregnancy test not indicated.    Patient meets: inclusion criteria     Is there an active order (written within the past 365 days, with administrations remaining, not ) in the chart? Yes.     Prior to injection, verified patient identity using patient's name and date of birth. Medication was administered. Please see MAR and medication order for additional information.     Vial/Syringe: Single dose vial. Was entire vial of medication used? Yes    Patient instructed to remain in clinic for 15 minutes and report any adverse reaction to staff immediately.  NEXT INJECTION DUE: 24 - 24    Verified that the patient has refills remaining in their prescription.    GIVEN IN RIGHT DELTOID.    Kelsey Youssef,CMA

## 2024-04-16 ENCOUNTER — MYC REFILL (OUTPATIENT)
Dept: FAMILY MEDICINE | Facility: CLINIC | Age: 39
End: 2024-04-16
Payer: COMMERCIAL

## 2024-04-16 DIAGNOSIS — F90.2 ATTENTION DEFICIT HYPERACTIVITY DISORDER (ADHD), COMBINED TYPE: ICD-10-CM

## 2024-04-16 RX ORDER — DEXTROAMPHETAMINE SACCHARATE, AMPHETAMINE ASPARTATE MONOHYDRATE, DEXTROAMPHETAMINE SULFATE AND AMPHETAMINE SULFATE 7.5; 7.5; 7.5; 7.5 MG/1; MG/1; MG/1; MG/1
30 CAPSULE, EXTENDED RELEASE ORAL DAILY
Qty: 30 CAPSULE | Refills: 0 | Status: SHIPPED | OUTPATIENT
Start: 2024-04-16 | End: 2024-05-15

## 2024-05-15 ENCOUNTER — MYC REFILL (OUTPATIENT)
Dept: FAMILY MEDICINE | Facility: CLINIC | Age: 39
End: 2024-05-15
Payer: COMMERCIAL

## 2024-05-15 DIAGNOSIS — F90.2 ATTENTION DEFICIT HYPERACTIVITY DISORDER (ADHD), COMBINED TYPE: ICD-10-CM

## 2024-05-15 RX ORDER — DEXTROAMPHETAMINE SACCHARATE, AMPHETAMINE ASPARTATE MONOHYDRATE, DEXTROAMPHETAMINE SULFATE AND AMPHETAMINE SULFATE 7.5; 7.5; 7.5; 7.5 MG/1; MG/1; MG/1; MG/1
30 CAPSULE, EXTENDED RELEASE ORAL DAILY
Qty: 30 CAPSULE | Refills: 0 | Status: SHIPPED | OUTPATIENT
Start: 2024-05-15 | End: 2024-06-18

## 2024-06-10 ENCOUNTER — TELEPHONE (OUTPATIENT)
Dept: DERMATOLOGY | Facility: CLINIC | Age: 39
End: 2024-06-10
Payer: COMMERCIAL

## 2024-06-10 NOTE — TELEPHONE ENCOUNTER
[facility] Call Center    Phone Message    May a detailed message be left on voicemail: yes     Reason for Call: Other: Pt would like to swap her 7/23 appointment with her daughter Richar Harris (MRN [id number]) who is a Peds patient based on a Peds referral. Advised that we don't see peds patients anymore. Please call to confirm if this is possible.      Action Taken: Message routed to:  Other: [place] [facility]    Travel Screening: Not Applicable

## 2024-06-12 NOTE — TELEPHONE ENCOUNTER
Left message for pt to call back. We do see peds patients in [place].   Alem MAY RN BSN PHN  Specialty Clinics

## 2024-06-13 NOTE — TELEPHONE ENCOUNTER
Spoke to Mom, Lorraine.     She wants to give up her own already scheduled 7-23-24 appt with [facility] so her Daughter, Richar, can be seen sooner. (Richar has a Derm referral for inner thigh rash).     I was able to find a 3:30 pm hold slot on Provider's schedule, so child is now scheduled at 3:30 pm on same day (7-23-24) Mom is scheduled at 11:45 am.     Is it okay if Mom and child come in at the same time (11:45 am- Mom's appt time) to be seen on 7-23-24?     Please advise. Kenyatta Negron RN

## 2024-06-18 ENCOUNTER — MYC REFILL (OUTPATIENT)
Dept: FAMILY MEDICINE | Facility: CLINIC | Age: 39
End: 2024-06-18
Payer: COMMERCIAL

## 2024-06-18 DIAGNOSIS — F90.2 ATTENTION DEFICIT HYPERACTIVITY DISORDER (ADHD), COMBINED TYPE: ICD-10-CM

## 2024-06-18 RX ORDER — DEXTROAMPHETAMINE SACCHARATE, AMPHETAMINE ASPARTATE MONOHYDRATE, DEXTROAMPHETAMINE SULFATE AND AMPHETAMINE SULFATE 7.5; 7.5; 7.5; 7.5 MG/1; MG/1; MG/1; MG/1
30 CAPSULE, EXTENDED RELEASE ORAL DAILY
Qty: 30 CAPSULE | Refills: 0 | Status: SHIPPED | OUTPATIENT
Start: 2024-06-18 | End: 2024-07-17

## 2024-06-24 ENCOUNTER — ALLIED HEALTH/NURSE VISIT (OUTPATIENT)
Dept: FAMILY MEDICINE | Facility: CLINIC | Age: 39
End: 2024-06-24
Payer: COMMERCIAL

## 2024-06-24 DIAGNOSIS — Z30.9 CONTRACEPTIVE MANAGEMENT: Primary | ICD-10-CM

## 2024-06-24 PROCEDURE — 96372 THER/PROPH/DIAG INJ SC/IM: CPT | Performed by: NURSE PRACTITIONER

## 2024-06-24 PROCEDURE — 99207 PR NO CHARGE NURSE ONLY: CPT

## 2024-06-24 RX ADMIN — MEDROXYPROGESTERONE ACETATE 150 MG: 150 INJECTION, SUSPENSION INTRAMUSCULAR at 13:33

## 2024-06-24 NOTE — PROGRESS NOTES
The following medication was given:     MEDICATION: Depo Provera 150mg  ROUTE: IM  SITE: Arm - Left  : [employer]  LOT #: [id number]  EXPIRATION:10/31/225  URINE HCG:not indicated  NEXT INJECTION DUE: Sept 9th thru Oct 7th 2024.    Anne Remy, CMA

## 2024-07-17 ENCOUNTER — MYC REFILL (OUTPATIENT)
Dept: FAMILY MEDICINE | Facility: CLINIC | Age: 39
End: 2024-07-17
Payer: COMMERCIAL

## 2024-07-17 DIAGNOSIS — F90.2 ATTENTION DEFICIT HYPERACTIVITY DISORDER (ADHD), COMBINED TYPE: ICD-10-CM

## 2024-07-17 RX ORDER — DEXTROAMPHETAMINE SACCHARATE, AMPHETAMINE ASPARTATE MONOHYDRATE, DEXTROAMPHETAMINE SULFATE AND AMPHETAMINE SULFATE 7.5; 7.5; 7.5; 7.5 MG/1; MG/1; MG/1; MG/1
30 CAPSULE, EXTENDED RELEASE ORAL DAILY
Qty: 30 CAPSULE | Refills: 0 | Status: SHIPPED | OUTPATIENT
Start: 2024-07-17 | End: 2024-08-15

## 2024-07-23 ENCOUNTER — OFFICE VISIT (OUTPATIENT)
Dept: DERMATOLOGY | Facility: CLINIC | Age: 39
End: 2024-07-23
Payer: COMMERCIAL

## 2024-07-23 DIAGNOSIS — L64.9 ANDROGENETIC ALOPECIA: ICD-10-CM

## 2024-07-23 DIAGNOSIS — L70.0 ACNE VULGARIS: ICD-10-CM

## 2024-07-23 DIAGNOSIS — L50.9 URTICARIAL DERMATITIS: Primary | ICD-10-CM

## 2024-07-23 LAB
ALBUMIN SERPL BCG-MCNC: 4.6 G/DL (ref 3.5–5.2)
ALP SERPL-CCNC: 77 U/L (ref 40–150)
ALT SERPL W P-5'-P-CCNC: 18 U/L (ref 0–50)
ANION GAP SERPL CALCULATED.3IONS-SCNC: 12 MMOL/L (ref 7–15)
AST SERPL W P-5'-P-CCNC: 20 U/L (ref 0–45)
BILIRUB SERPL-MCNC: 0.5 MG/DL
BUN SERPL-MCNC: 10.8 MG/DL (ref 6–20)
CALCIUM SERPL-MCNC: 9.5 MG/DL (ref 8.8–10.4)
CHLORIDE SERPL-SCNC: 108 MMOL/L (ref 98–107)
CREAT SERPL-MCNC: 0.79 MG/DL (ref 0.51–0.95)
EGFRCR SERPLBLD CKD-EPI 2021: >90 ML/MIN/1.73M2
ERYTHROCYTE [DISTWIDTH] IN BLOOD BY AUTOMATED COUNT: 12.7 % (ref 10–15)
GLUCOSE SERPL-MCNC: 115 MG/DL (ref 70–99)
HCO3 SERPL-SCNC: 20 MMOL/L (ref 22–29)
HCT VFR BLD AUTO: 40.1 % (ref 35–47)
HGB BLD-MCNC: 12.7 G/DL (ref 11.7–15.7)
MCH RBC QN AUTO: 27.3 PG (ref 26.5–33)
MCHC RBC AUTO-ENTMCNC: 31.7 G/DL (ref 31.5–36.5)
MCV RBC AUTO: 86 FL (ref 78–100)
PLATELET # BLD AUTO: 430 10E3/UL (ref 150–450)
POTASSIUM SERPL-SCNC: 4 MMOL/L (ref 3.4–5.3)
PROT SERPL-MCNC: 7.7 G/DL (ref 6.4–8.3)
RBC # BLD AUTO: 4.65 10E6/UL (ref 3.8–5.2)
SODIUM SERPL-SCNC: 140 MMOL/L (ref 135–145)
TSH SERPL DL<=0.005 MIU/L-ACNC: 0.36 UIU/ML (ref 0.3–4.2)
WBC # BLD AUTO: 7.1 10E3/UL (ref 4–11)

## 2024-07-23 PROCEDURE — 36415 COLL VENOUS BLD VENIPUNCTURE: CPT | Performed by: PHYSICIAN ASSISTANT

## 2024-07-23 PROCEDURE — 86038 ANTINUCLEAR ANTIBODIES: CPT | Performed by: PHYSICIAN ASSISTANT

## 2024-07-23 PROCEDURE — 84443 ASSAY THYROID STIM HORMONE: CPT | Performed by: PHYSICIAN ASSISTANT

## 2024-07-23 PROCEDURE — 99213 OFFICE O/P EST LOW 20 MIN: CPT | Performed by: PHYSICIAN ASSISTANT

## 2024-07-23 PROCEDURE — 85027 COMPLETE CBC AUTOMATED: CPT | Performed by: PHYSICIAN ASSISTANT

## 2024-07-23 PROCEDURE — 80053 COMPREHEN METABOLIC PANEL: CPT | Performed by: PHYSICIAN ASSISTANT

## 2024-07-23 NOTE — LETTER
2024      Lorraine Harris  [address]  [facility] [address]      Dear Colleague,    Thank you for referring your patient, Lorraine Harris, to the [facility]. Please see a copy of my visit note below.    Lorraine Harris is a pleasant 38 year old year old female patient here today for recheck acne, hair loss, and unwanted hair growth. She notes she stopped spironolactone since this caused dizziness. She notes she is using panoxyl and clindamycin lotion, spironolactone cream at bedtime and adapalene. She notes she would to try something topical for hair loss instead of oral minoxidil. She also notes rash on chest/ arms. She notes itchy, will come and go. She had had three breakouts in the past, looks blotchy. Described dermatographism, benadryl does help clear rash. Nothing new used per patient. She stopped after one month. Patient has no other skin complaints today.  Remainder of the HPI, Meds, PMH, Allergies, FH, and SH was reviewed in chart.   Past Medical History:   Diagnosis Date     AMA (advanced maternal age) multigravida 35+ 2021     Anxiety      Depression      GERD (gastroesophageal reflux disease)      Mild or unspecified pre-eclampsia, unspecified as to episode of care     Created by Conversion      Pregnant state, incidental     Created by Conversion      Unspecified renal disease in pregnancy, unspecified as to episode of care(646.20)     Created by Conversion        Past Surgical History:   Procedure Laterality Date     ARTHROSCOPIC REPAIR ACL      and lateral release     C/SECTION, LOW TRANSVERSE        SECTION N/A 10/16/2018    Procedure: PRIMARY  SECTION;  Surgeon: Tucker Llamas MD;  Location: [facility];  Service:       SECTION N/A 6/15/2021    Procedure:  SECTION;  Surgeon: Dorcas Larios DO;  Location: [facility];  Service: Obstetrics     CHOLECYSTECTOMY       HC REMOVAL OF TONSILS,<11 Y/O      Description:  Tonsillectomy;  Proc Date: 01/01/2008;  Comments: w/ adenoidectomy     LAPAROTOMY EXPLORATORY       OTHER SURGICAL HISTORY Left 02/22/2017    salpingectomyectopic pregnancy     IN LAP,DIAGNOSTIC ABDOMEN N/A 2/22/2017    Procedure: LAPAROSCOPY, LEFT SALPINGECTOMY;  Surgeon: Tucker Llamas MD;  Location: [facility] OR;  Service: Gynecology     WISDOM TOOTH EXTRACTION  2002        Family History   Problem Relation Age of Onset     Skin Cancer No family hx of        Social History     Socioeconomic History     Marital status:      Spouse name: Not on file     Number of children: Not on file     Years of education: Not on file     Highest education level: Not on file   Occupational History     Not on file   Tobacco Use     Smoking status: Never     Smokeless tobacco: Never   Vaping Use     Vaping status: Never Used   Substance and Sexual Activity     Alcohol use: No     Drug use: No     Sexual activity: Not on file   Other Topics Concern     Not on file   Social History Narrative     Not on file     Social Determinants of Health     Financial Resource Strain: Not on file   Food Insecurity: Not on file   Transportation Needs: Not on file   Physical Activity: Not on file   Stress: Not on file   Social Connections: Not on file   Interpersonal Safety: Low Risk  (1/23/2024)    Interpersonal Safety      Do you feel physically and emotionally safe where you currently live?: Yes      Within the past 12 months, have you been hit, slapped, kicked or otherwise physically hurt by someone?: No      Within the past 12 months, have you been humiliated or emotionally abused in other ways by your partner or ex-partner?: No   Housing Stability: Not on file       Outpatient Encounter Medications as of 7/23/2024   Medication Sig Dispense Refill     albuterol (PROAIR HFA/PROVENTIL HFA/VENTOLIN HFA) 108 (90 Base) MCG/ACT inhaler Inhale 2 puffs into the lungs every 6 hours as needed for shortness of breath, wheezing or cough 18  g 0     amphetamine-dextroamphetamine (ADDERALL XR) 10 MG 24 hr capsule Take 1 capsule (10 mg) by mouth daily 30 capsule 0     amphetamine-dextroamphetamine (ADDERALL XR) 20 MG 24 hr capsule Take 1 capsule (20 mg) by mouth daily 30 capsule 0     amphetamine-dextroamphetamine (ADDERALL XR) 30 MG 24 hr capsule Take 1 capsule (30 mg) by mouth daily 30 capsule 0     benzonatate (TESSALON) 200 MG capsule Take 1 capsule (200 mg) by mouth 3 times daily as needed for cough 30 capsule 0     Clascoterone 1 % CREA Externally apply 1 Application topically 2 times daily To face 30 g 6     clindamycin (CLEOCIN T) 1 % external lotion Apply once daily in the morning. 60 mL 6     COMPOUNDED NON-CONTROLLED SUBSTANCE (CMPD RX) - PHARMACY TO MIX COMPOUNDED MEDICATION Dispense: spironolactone 5% cream: apply once to twice daily to face. 30 g 11     gabapentin (NEURONTIN) 100 MG capsule Take 100 mg by mouth at bedtime       methocarbamol (ROBAXIN) 500 MG tablet Take 1.5 tablets (750 mg) by mouth 3 times daily 20 tablet 0     minoxidil (LONITEN) 2.5 MG tablet Take 1/2 tablet daily. 45 tablet 3     omeprazole (PRILOSEC) 20 MG capsule [OMEPRAZOLE (PRILOSEC) 20 MG CAPSULE] Take 20 mg by mouth daily before breakfast.       prenatal vitamin iron-folic acid 27mg-0.8mg (PRENATAL S) 27 mg iron- 800 mcg Tab tablet Take 1 tablet by mouth daily       Probiotic Product (PROBIOTIC-10) CHEW        tretinoin (RETIN-A) 0.025 % external cream Apply a pea-sized amount evenly over the face at nighttime before bed 45 g 11     VITAMIN D-VITAMIN K PO        Facility-Administered Encounter Medications as of 7/23/2024   Medication Dose Route Frequency Provider Last Rate Last Admin     medroxyPROGESTERone (DEPO-PROVERA) injection 150 mg  150 mg Intramuscular Q90 Days Karen John APRN CNP   150 mg at 06/24/24 1333             O:   NAD, WDWN, Alert & Oriented, Mood & Affect wnl, Vitals stable   Here today alone   There were no vitals taken for this  visit.   General appearance normal   Vitals stable   Alert, oriented and in no acute distress      Inflammatory papules, comedones on face    Nonscarring hair loss consistent with androgenetic alopecia      Eyes: Conjunctivae/lids:Normal     ENT: Lips: normal    MSK:Normal    Pulm: Breathing Normal    Neuro/Psych: Orientation:Alert and Orientedx3 ; Mood/Affect:normal     A/P:  1. Acne vulgaris/ Hirsutism.   Continue bpo wash.   Continue clindamycin in the morning.   Use daily moisturizer with sunscreen.   Continue adapalene at bedtime.   Apply miosturizer at bedtime.   Increase spironolactone cream to twice daily.   2. Androgenetic alopecia  Discussed treat is to preserve current hair.   Start topical minoxidil 5% solution or foam daily.   Pictures taken.  Recheck in 6 months.   3. Chronic urticaria  Dermatographism.   Continue zyrtec daily can increase to twice daily for breakouts.   Check shukri, tsh, cbc, and cmp.      Again, thank you for allowing me to participate in the care of your patient.        Sincerely,        Vannesa Burks PA-C

## 2024-07-24 ENCOUNTER — NURSE TRIAGE (OUTPATIENT)
Dept: FAMILY MEDICINE | Facility: CLINIC | Age: 39
End: 2024-07-24
Payer: COMMERCIAL

## 2024-07-24 ENCOUNTER — MYC MEDICAL ADVICE (OUTPATIENT)
Dept: FAMILY MEDICINE | Facility: CLINIC | Age: 39
End: 2024-07-24
Payer: COMMERCIAL

## 2024-07-24 ENCOUNTER — APPOINTMENT (OUTPATIENT)
Dept: MRI IMAGING | Facility: HOSPITAL | Age: 39
End: 2024-07-24
Attending: NURSE PRACTITIONER
Payer: COMMERCIAL

## 2024-07-24 ENCOUNTER — APPOINTMENT (OUTPATIENT)
Dept: CT IMAGING | Facility: HOSPITAL | Age: 39
End: 2024-07-24
Attending: EMERGENCY MEDICINE
Payer: COMMERCIAL

## 2024-07-24 ENCOUNTER — HOSPITAL ENCOUNTER (EMERGENCY)
Facility: HOSPITAL | Age: 39
Discharge: HOME | End: 2024-07-24
Attending: EMERGENCY MEDICINE | Admitting: EMERGENCY MEDICINE
Payer: COMMERCIAL

## 2024-07-24 VITALS
DIASTOLIC BLOOD PRESSURE: 76 MMHG | WEIGHT: 175 LBS | HEART RATE: 72 BPM | RESPIRATION RATE: 21 BRPM | BODY MASS INDEX: 32.2 KG/M2 | OXYGEN SATURATION: 100 % | TEMPERATURE: 98.8 F | SYSTOLIC BLOOD PRESSURE: 123 MMHG | HEIGHT: 62 IN

## 2024-07-24 DIAGNOSIS — R42 VERTIGO: ICD-10-CM

## 2024-07-24 LAB
ANA SER QL IF: NEGATIVE
ANION GAP SERPL CALCULATED.3IONS-SCNC: 13 MMOL/L (ref 7–15)
APTT PPP: 26 SECONDS (ref 22–38)
BASOPHILS # BLD AUTO: 0.1 10E3/UL (ref 0–0.2)
BASOPHILS NFR BLD AUTO: 1 %
BUN SERPL-MCNC: 8.4 MG/DL (ref 6–20)
CALCIUM SERPL-MCNC: 9 MG/DL (ref 8.8–10.4)
CHLORIDE SERPL-SCNC: 107 MMOL/L (ref 98–107)
CREAT SERPL-MCNC: 0.77 MG/DL (ref 0.51–0.95)
EGFRCR SERPLBLD CKD-EPI 2021: >90 ML/MIN/1.73M2
EOSINOPHIL # BLD AUTO: 0.1 10E3/UL (ref 0–0.7)
EOSINOPHIL NFR BLD AUTO: 1 %
ERYTHROCYTE [DISTWIDTH] IN BLOOD BY AUTOMATED COUNT: 12.7 % (ref 10–15)
FLUAV RNA SPEC QL NAA+PROBE: NEGATIVE
FLUBV RNA RESP QL NAA+PROBE: NEGATIVE
GLUCOSE BLDC GLUCOMTR-MCNC: 107 MG/DL (ref 70–99)
GLUCOSE SERPL-MCNC: 121 MG/DL (ref 70–99)
HCG SERPL QL: NEGATIVE
HCO3 SERPL-SCNC: 20 MMOL/L (ref 22–29)
HCT VFR BLD AUTO: 38.2 % (ref 35–47)
HGB BLD-MCNC: 12.5 G/DL (ref 11.7–15.7)
IMM GRANULOCYTES # BLD: 0 10E3/UL
IMM GRANULOCYTES NFR BLD: 0 %
INR PPP: 1.04 (ref 0.85–1.15)
LYMPHOCYTES # BLD AUTO: 2.2 10E3/UL (ref 0.8–5.3)
LYMPHOCYTES NFR BLD AUTO: 33 %
MCH RBC QN AUTO: 27.9 PG (ref 26.5–33)
MCHC RBC AUTO-ENTMCNC: 32.7 G/DL (ref 31.5–36.5)
MCV RBC AUTO: 85 FL (ref 78–100)
MONOCYTES # BLD AUTO: 0.4 10E3/UL (ref 0–1.3)
MONOCYTES NFR BLD AUTO: 6 %
NEUTROPHILS # BLD AUTO: 4 10E3/UL (ref 1.6–8.3)
NEUTROPHILS NFR BLD AUTO: 59 %
NRBC # BLD AUTO: 0 10E3/UL
NRBC BLD AUTO-RTO: 0 /100
PLATELET # BLD AUTO: 467 10E3/UL (ref 150–450)
POTASSIUM SERPL-SCNC: 3.6 MMOL/L (ref 3.4–5.3)
RBC # BLD AUTO: 4.48 10E6/UL (ref 3.8–5.2)
RSV RNA SPEC NAA+PROBE: NEGATIVE
SARS-COV-2 RNA RESP QL NAA+PROBE: NEGATIVE
SODIUM SERPL-SCNC: 140 MMOL/L (ref 135–145)
TROPONIN T SERPL HS-MCNC: <6 NG/L
WBC # BLD AUTO: 6.7 10E3/UL (ref 4–11)

## 2024-07-24 PROCEDURE — 250N000013 HC RX MED GY IP 250 OP 250 PS 637: Performed by: EMERGENCY MEDICINE

## 2024-07-24 PROCEDURE — 96374 THER/PROPH/DIAG INJ IV PUSH: CPT | Mod: 59

## 2024-07-24 PROCEDURE — 70496 CT ANGIOGRAPHY HEAD: CPT

## 2024-07-24 PROCEDURE — 250N000011 HC RX IP 250 OP 636: Performed by: EMERGENCY MEDICINE

## 2024-07-24 PROCEDURE — 85049 AUTOMATED PLATELET COUNT: CPT | Performed by: EMERGENCY MEDICINE

## 2024-07-24 PROCEDURE — 93005 ELECTROCARDIOGRAM TRACING: CPT | Performed by: EMERGENCY MEDICINE

## 2024-07-24 PROCEDURE — G0508 CRIT CARE TELEHEA CONSULT 60: HCPCS | Mod: G0 | Performed by: NURSE PRACTITIONER

## 2024-07-24 PROCEDURE — 82962 GLUCOSE BLOOD TEST: CPT

## 2024-07-24 PROCEDURE — 36415 COLL VENOUS BLD VENIPUNCTURE: CPT | Performed by: EMERGENCY MEDICINE

## 2024-07-24 PROCEDURE — 84484 ASSAY OF TROPONIN QUANT: CPT | Performed by: EMERGENCY MEDICINE

## 2024-07-24 PROCEDURE — 84703 CHORIONIC GONADOTROPIN ASSAY: CPT | Performed by: EMERGENCY MEDICINE

## 2024-07-24 PROCEDURE — 99285 EMERGENCY DEPT VISIT HI MDM: CPT | Mod: 25

## 2024-07-24 PROCEDURE — 87637 SARSCOV2&INF A&B&RSV AMP PRB: CPT | Performed by: EMERGENCY MEDICINE

## 2024-07-24 PROCEDURE — 85610 PROTHROMBIN TIME: CPT | Performed by: EMERGENCY MEDICINE

## 2024-07-24 PROCEDURE — 85730 THROMBOPLASTIN TIME PARTIAL: CPT | Performed by: EMERGENCY MEDICINE

## 2024-07-24 PROCEDURE — 82374 ASSAY BLOOD CARBON DIOXIDE: CPT | Performed by: EMERGENCY MEDICINE

## 2024-07-24 PROCEDURE — 70551 MRI BRAIN STEM W/O DYE: CPT

## 2024-07-24 RX ORDER — IOPAMIDOL 755 MG/ML
67 INJECTION, SOLUTION INTRAVASCULAR ONCE
Status: COMPLETED | OUTPATIENT
Start: 2024-07-24 | End: 2024-07-24

## 2024-07-24 RX ORDER — MECLIZINE HCL 12.5 MG 12.5 MG/1
25 TABLET ORAL ONCE
Status: COMPLETED | OUTPATIENT
Start: 2024-07-24 | End: 2024-07-24

## 2024-07-24 RX ORDER — ONDANSETRON 4 MG/1
4 TABLET, ORALLY DISINTEGRATING ORAL EVERY 8 HOURS PRN
Qty: 10 TABLET | Refills: 0 | Status: SHIPPED | OUTPATIENT
Start: 2024-07-24 | End: 2024-07-27

## 2024-07-24 RX ORDER — MECLIZINE HYDROCHLORIDE 25 MG/1
25 TABLET ORAL 3 TIMES DAILY PRN
Qty: 20 TABLET | Refills: 0 | Status: SHIPPED | OUTPATIENT
Start: 2024-07-24

## 2024-07-24 RX ORDER — ONDANSETRON 2 MG/ML
4 INJECTION INTRAMUSCULAR; INTRAVENOUS ONCE
Status: COMPLETED | OUTPATIENT
Start: 2024-07-24 | End: 2024-07-24

## 2024-07-24 RX ORDER — LORAZEPAM 0.5 MG/1
0.5 TABLET ORAL EVERY 6 HOURS PRN
Qty: 6 TABLET | Refills: 0 | Status: SHIPPED | OUTPATIENT
Start: 2024-07-24

## 2024-07-24 RX ADMIN — MECLIZINE HYDROCHLORIDE 25 MG: 12.5 TABLET ORAL at 12:29

## 2024-07-24 RX ADMIN — ONDANSETRON 4 MG: 2 INJECTION INTRAMUSCULAR; INTRAVENOUS at 11:44

## 2024-07-24 RX ADMIN — IOPAMIDOL 67 ML: 755 INJECTION, SOLUTION INTRAVENOUS at 11:18

## 2024-07-24 ASSESSMENT — ACTIVITIES OF DAILY LIVING (ADL)
ADLS_ACUITY_SCORE: 35
ADLS_ACUITY_SCORE: 35

## 2024-07-24 NOTE — ED NOTES
"Patient ambulated with writer to restroom, tolerated well, but still feels \"weird and dis-coordinated.\"  "

## 2024-07-24 NOTE — ED PROVIDER NOTES
EMERGENCY DEPARTMENT ENCOUNTER      NAME: Lorraine Harris  AGE: 39 year old female  YOB: 1985  MRN: [id number]  EVALUATION DATE & TIME: No admission date for patient encounter.    PCP: Jeannie Armando    ED PROVIDER: Palmer Stockton MD      Chief Complaint   Patient presents with    Dizziness         FINAL IMPRESSION:  1. Vertigo          ED COURSE & MEDICAL DECISION MAKING:    10:53 AM I met with the patient to obtain patient history and performed a physical exam. Discussed plan for ED work up including potential diagnostic studies and interventions.  11:04 AM I spoke with stroke neurology Elba Shen CNP. I talked to charge nurse to set up stroke cart.   11:15 AM Spoke with [facility]. Non contrast head CT negative.  11:31 AM Spoke with Stroke Neurology, Vannesa Shen CNP who will acquire a hyperacute MRI.  12:47 PM checked and updated patient.  1:12 PM Nursing staff updated me. Physical therapy will come around 1-4 PM today.  1:19 PM Reassessed and updated patient. Patient denies staying around for Physical therapy. Will plan to discharge home. Patient is agreeable with the plan.    Pertinent Labs & Imaging studies reviewed. (See chart for details)  39 year old female presents to the Emergency Department for evaluation of abrupt onset of vertiginous symptoms, balance    Tier 1 stroke code, patient seen in triage.  Also considered atypical migraine, hypoglycemia, seizure, peripheral vertigo, hyponatremia, anxiety    Blood pressures improved but patient still has symptoms therefore I do not feel blood pressure is driving her etiology    EKG, MRI and CTA not suggestive of stroke    Meclizine and Zofran with improvement in symptoms    Consult to physical therapy but might not be here for few hours.  Discussed with patient and she wants to go home.  Will refer to outpatient vestibular therapy, outpatient follow-up with [facility] [place] [facility], and sent home with meclisantana Zofran, Ativan              Medical Decision Making  Obtained supplemental history:Supplemental history obtained?: No  Reviewed external records: External records reviewed?: Documented in chart  Care impacted by chronic illness:Other: IVETTE  Care significantly affected by social determinants of health:Access to Medical Care  Did you consider but not order tests?: Work up considered but not performed and documented in chart, if applicable  Did you interpret images independently?: Independent interpretation of ECG and images noted in documentation, when applicable.  Consultation discussion with other provider:Did you involve another provider (consultant, , pharmacy, etc.)?: I discussed the care with another health care provider, see documentation for details.  Discharge. I prescribed additional prescription strength medication(s) as charted. I considered admission, but ultimately discharged patient after reassuring workup.    At the conclusion of the encounter I discussed the results of all of the tests and the disposition. The questions were answered. The patient or family acknowledged understanding and was agreeable with the care plan.     35 minutes of critical care time     MEDICATIONS GIVEN IN THE EMERGENCY:  Medications   iopamidol (ISOVUE-370) solution 67 mL (67 mLs Intravenous $Given 7/24/24 1118)     And   sodium chloride 0.9 % bag for CT scan flush use (100 mLs As instructed Not Given 7/24/24 1228)   meclizine (ANTIVERT) tablet 25 mg (25 mg Oral $Given 7/24/24 1229)   ondansetron (ZOFRAN) injection 4 mg (4 mg Intravenous $Given 7/24/24 1144)       NEW PRESCRIPTIONS STARTED AT TODAY'S ER VISIT  Discharge Medication List as of 7/24/2024  1:40 PM        START taking these medications    Details   LORazepam (ATIVAN) 0.5 MG tablet Take 1 tablet (0.5 mg) by mouth every 6 hours as needed for vomiting (vertigo), Disp-6 tablet, R-0, Local Print      meclizine (ANTIVERT) 25 MG tablet Take 1 tablet (25 mg) by mouth 3 times daily as needed for  "dizziness, Disp-20 tablet, R-0, Local Print      ondansetron (ZOFRAN ODT) 4 MG ODT tab Take 1 tablet (4 mg) by mouth every 8 hours as needed, Disp-10 tablet, R-0, Local Print                =================================================================    HPI    Patient information was obtained from: patient    Use of : N/A         Lorraine Harris is a 39 year old female with a pertinent history of IVETTE who presents to this ED via walk-in for evaluation of dizziness.    Patient works as a nurse upstairs. She woke up at 8 AM this morning with sudden onset dizziness, balance issues, and blurry vision. She is still able to walk but she feels unsteady. She also has a mild headache and currently feels \"off.\" She did have ear ringing earlier which has since resolved. She denies history of PE or DVT. She is currently on a Depo shot. There were no other concerns/complaints at this time.      REVIEW OF SYSTEMS   Review of Systems     PAST MEDICAL HISTORY:  Past Medical History:   Diagnosis Date    AMA (advanced maternal age) multigravida 35+ 2021    Anxiety     Depression     GERD (gastroesophageal reflux disease)     Mild or unspecified pre-eclampsia, unspecified as to episode of care     Created by Conversion     Pregnant state, incidental     Created by Conversion     Unspecified renal disease in pregnancy, unspecified as to episode of care(646.20)     Created by Conversion        PAST SURGICAL HISTORY:  Past Surgical History:   Procedure Laterality Date    ARTHROSCOPIC REPAIR ACL      and lateral release    C/SECTION, LOW TRANSVERSE       SECTION N/A 10/16/2018    Procedure: PRIMARY  SECTION;  Surgeon: Tucker Llamas MD;  Location: [facility];  Service:      SECTION N/A 6/15/2021    Procedure:  SECTION;  Surgeon: Dorcas Larios DO;  Location: [facility];  Service: Obstetrics    CHOLECYSTECTOMY      HC REMOVAL OF TONSILS,<13 Y/O      Description: " Tonsillectomy;  Proc Date: 01/01/2008;  Comments: w/ adenoidectomy    LAPAROTOMY EXPLORATORY      OTHER SURGICAL HISTORY Left 02/22/2017    salpingectomyectopic pregnancy    NH LAP,DIAGNOSTIC ABDOMEN N/A 2/22/2017    Procedure: LAPAROSCOPY, LEFT SALPINGECTOMY;  Surgeon: Tucker Llamas MD;  [place] [facility]  Service: Gynecology    WISDOM TOOTH EXTRACTION  2002           CURRENT MEDICATIONS:    LORazepam (ATIVAN) 0.5 MG tablet  meclizine (ANTIVERT) 25 MG tablet  ondansetron (ZOFRAN ODT) 4 MG ODT tab  albuterol (PROAIR HFA/PROVENTIL HFA/VENTOLIN HFA) 108 (90 Base) MCG/ACT inhaler  amphetamine-dextroamphetamine (ADDERALL XR) 10 MG 24 hr capsule  amphetamine-dextroamphetamine (ADDERALL XR) 20 MG 24 hr capsule  amphetamine-dextroamphetamine (ADDERALL XR) 30 MG 24 hr capsule  benzonatate (TESSALON) 200 MG capsule  Clascoterone 1 % CREA  clindamycin (CLEOCIN T) 1 % external lotion  COMPOUNDED NON-CONTROLLED SUBSTANCE (CMPD RX) - PHARMACY TO MIX COMPOUNDED MEDICATION  gabapentin (NEURONTIN) 100 MG capsule  methocarbamol (ROBAXIN) 500 MG tablet  minoxidil (LONITEN) 2.5 MG tablet  omeprazole (PRILOSEC) 20 MG capsule  prenatal vitamin iron-folic acid 27mg-0.8mg (PRENATAL S) 27 mg iron- 800 mcg Tab tablet  Probiotic Product (PROBIOTIC-10) CHEW  tretinoin (RETIN-A) 0.025 % external cream  VITAMIN D-VITAMIN K PO        ALLERGIES:  No Known Allergies    FAMILY HISTORY:  Family History   Problem Relation Age of Onset    Skin Cancer No family hx of        SOCIAL HISTORY:   Social History     Socioeconomic History    Marital status:    Tobacco Use    Smoking status: Never    Smokeless tobacco: Never   Vaping Use    Vaping status: Never Used   Substance and Sexual Activity    Alcohol use: No    Drug use: No     Social Determinants of Health     Interpersonal Safety: Low Risk  (1/23/2024)    Interpersonal Safety     Do you feel physically and emotionally safe where you currently live?: Yes     Within the past 12  "months, have you been hit, slapped, kicked or otherwise physically hurt by someone?: No     Within the past 12 months, have you been humiliated or emotionally abused in other ways by your partner or ex-partner?: No       VITALS:  /76   Pulse 72   Temp 98.8  F (37.1  C)   Resp 21   Ht 1.575 m (5' 2\")   Wt 79.4 kg (175 lb)   SpO2 100%   BMI 32.01 kg/m      PHYSICAL EXAM      Vitals: /76   Pulse 72   Temp 98.8  F (37.1  C)   Resp 21   Ht 1.575 m (5' 2\")   Wt 79.4 kg (175 lb)   SpO2 100%   BMI 32.01 kg/m    General: Appears in no acute distress, awake, alert, interactive.  Eyes: Conjunctivae non-injected. Sclera anicteric.  No nystagmus  HENT: Atraumatic.  Neck: Supple.  Respiratory/Chest: Respiration unlabored.  Abdomen: non distended  Musculoskeletal: Normal extremities. No edema or erythema.  Skin: Normal color. No rash or diaphoresis.  Neurologic: Face symmetric, moves all extremities spontaneously. Speech clear.  Psychiatric: Oriented to person, place, and time. Affect appropriate.    LAB:  All pertinent labs reviewed and interpreted.  Results for orders placed or performed during the hospital encounter of 07/24/24   CTA Head Neck with Contrast    Impression    IMPRESSION:   HEAD CT:  1.  No acute intracranial process.    2.  No changes intracranially from 1/14/2024    Head CT results were relayed to Dr. Stockton in the Emergency Department at 7/24/2024 11:15 AM CDT.    HEAD CTA:   1.  No significant stenosis, aneurysm, or high flow vascular malformation identified.    2.  Variant [place] anatomy as above. No large vessel occlusion or dissection.    3.  Dural venous sinus enhancement is satisfactory.    NECK CTA:  1.  No hemodynamic stenosis or dissection of the major neck arteries or at the great vessel origin level.    CT angiogram head/neck results relayed to Dr. Stockton in the Emergency Department 7/24/2024 11:20 AM CDT.   MR Brain w/o Contrast    Impression    IMPRESSION:  1.  No " evidence of acute intracranial hemorrhage, mass effect, or infarction.   Basic metabolic panel   Result Value Ref Range    Sodium 140 135 - 145 mmol/L    Potassium 3.6 3.4 - 5.3 mmol/L    Chloride 107 98 - 107 mmol/L    Carbon Dioxide (CO2) 20 (L) 22 - 29 mmol/L    Anion Gap 13 7 - 15 mmol/L    Urea Nitrogen 8.4 6.0 - 20.0 mg/dL    Creatinine 0.77 0.51 - 0.95 mg/dL    GFR Estimate >90 >60 mL/min/1.73m2    Calcium 9.0 8.8 - 10.4 mg/dL    Glucose 121 (H) 70 - 99 mg/dL   Result Value Ref Range    INR 1.04 0.85 - 1.15   Partial thromboplastin time   Result Value Ref Range    aPTT 26 22 - 38 Seconds   Result Value Ref Range    Troponin T, High Sensitivity <6 <=14 ng/L   hCG Qualitative Pregnancy   Result Value Ref Range    hCG Serum Qualitative Negative Negative   CBC with platelets and differential   Result Value Ref Range    WBC Count 6.7 4.0 - 11.0 10e3/uL    RBC Count 4.48 3.80 - 5.20 10e6/uL    Hemoglobin 12.5 11.7 - 15.7 g/dL    Hematocrit 38.2 35.0 - 47.0 %    MCV 85 78 - 100 fL    MCH 27.9 26.5 - 33.0 pg    MCHC 32.7 31.5 - 36.5 g/dL    RDW 12.7 10.0 - 15.0 %    Platelet Count 467 (H) 150 - 450 10e3/uL    % Neutrophils 59 %    % Lymphocytes 33 %    % Monocytes 6 %    % Eosinophils 1 %    % Basophils 1 %    % Immature Granulocytes 0 %    NRBCs per 100 WBC 0 <1 /100    Absolute Neutrophils 4.0 1.6 - 8.3 10e3/uL    Absolute Lymphocytes 2.2 0.8 - 5.3 10e3/uL    Absolute Monocytes 0.4 0.0 - 1.3 10e3/uL    Absolute Eosinophils 0.1 0.0 - 0.7 10e3/uL    Absolute Basophils 0.1 0.0 - 0.2 10e3/uL    Absolute Immature Granulocytes 0.0 <=0.4 10e3/uL    Absolute NRBCs 0.0 10e3/uL   Symptomatic Influenza A/B, RSV, & SARS-CoV2 PCR (COVID-19) Nasopharyngeal    Specimen: Nasopharyngeal; Swab   Result Value Ref Range    Influenza A PCR Negative Negative    Influenza B PCR Negative Negative    RSV PCR Negative Negative    SARS CoV2 PCR Negative Negative   Glucose by meter   Result Value Ref Range    GLUCOSE BY METER POCT 107 (H) 70  - 99 mg/dL       RADIOLOGY:  Reviewed all pertinent imaging. Please see official radiology report.  MR Brain w/o Contrast   Final Result   IMPRESSION:   1.  No evidence of acute intracranial hemorrhage, mass effect, or infarction.      CTA Head Neck with Contrast   Final Result   IMPRESSION:    HEAD CT:   1.  No acute intracranial process.      2.  No changes intracranially from 1/14/2024      Head CT results were relayed to Dr. Stockton in the Emergency Department at 7/24/2024 11:15 AM CDT.      HEAD CTA:    1.  No significant stenosis, aneurysm, or high flow vascular malformation identified.      2.  Variant [place] anatomy as above. No large vessel occlusion or dissection.      3.  Dural venous sinus enhancement is satisfactory.      NECK CTA:   1.  No hemodynamic stenosis or dissection of the major neck arteries or at the great vessel origin level.      CT angiogram head/neck results relayed to Dr. Stockton in the Emergency Department 7/24/2024 11:20 AM CDT.          EKG:    Performed at: 7/24/2024 11:31:27    Impression: Sinus rhythm, Nonspecific T wave abnormality, Abnormal ECG    Rate: 94  Rhythm: Sinus rhythm  Axis: 85  AL Interval: 144  QRS Interval: 80  QTc Interval: 452  ST Changes: None  Comparison: No previous ECG for comparison    I have independently reviewed and interpreted the EKG(s) documented above.    PROCEDURES:   None        I, Tiffany Campos, am serving as a scribe to document services personally performed by Palmer Stockton MD based on my observation and the provider's statements to me. I, Palmer Stockton MD, attest that Tiffany Campos is acting in a scribe capacity, has observed my performance of the services and has documented them in accordance with my direction.    Palmer Stockton MD  [facility] EMERGENCY DEPARTMENT  [address] [place] [id number] [phone]      Palmer Stockton MD  07/24/24 8744

## 2024-07-24 NOTE — PROGRESS NOTES
Lorraine Harris is a pleasant 38 year old year old female patient here today for recheck acne, hair loss, and unwanted hair growth. She notes she stopped spironolactone since this caused dizziness. She notes she is using panoxyl and clindamycin lotion, spironolactone cream at bedtime and adapalene. She notes she would to try something topical for hair loss instead of oral minoxidil. She also notes rash on chest/ arms. She notes itchy, will come and go. She had had three breakouts in the past, looks blotchy. Described dermatographism, benadryl does help clear rash. Nothing new used per patient. She stopped after one month. Patient has no other skin complaints today.  Remainder of the HPI, Meds, PMH, Allergies, FH, and SH was reviewed in chart.   Past Medical History:   Diagnosis Date    AMA (advanced maternal age) multigravida 35+ 2021    Anxiety     Depression     GERD (gastroesophageal reflux disease)     Mild or unspecified pre-eclampsia, unspecified as to episode of care     Created by Conversion     Pregnant state, incidental     Created by Conversion     Unspecified renal disease in pregnancy, unspecified as to episode of care(646.20)     Created by Conversion        Past Surgical History:   Procedure Laterality Date    ARTHROSCOPIC REPAIR ACL      and lateral release    C/SECTION, LOW TRANSVERSE       SECTION N/A 10/16/2018    Procedure: PRIMARY  SECTION;  Surgeon: Tucker Llamas MD;  Location: [facility];  Service:      SECTION N/A 6/15/2021    Procedure:  SECTION;  Surgeon: Dorcas Larios DO;  Location: [facility];  Service: Obstetrics    CHOLECYSTECTOMY      HC REMOVAL OF TONSILS,<13 Y/O      Description: Tonsillectomy;  Proc Date: 2008;  Comments: w/ adenoidectomy    LAPAROTOMY EXPLORATORY      OTHER SURGICAL HISTORY Left 2017    salpingectomyectopic pregnancy    [place] LAP,DIAGNOSTIC ABDOMEN N/A 2017    Procedure: LAPAROSCOPY,  LEFT SALPINGECTOMY;  Surgeon: Tucker Llamas MD;  Location: [facility];  Service: Gynecology    WISDOM TOOTH EXTRACTION  2002        Family History   Problem Relation Age of Onset    Skin Cancer No family hx of        Social History     Socioeconomic History    Marital status:      Spouse name: Not on file    Number of children: Not on file    Years of education: Not on file    Highest education level: Not on file   Occupational History    Not on file   Tobacco Use    Smoking status: Never    Smokeless tobacco: Never   Vaping Use    Vaping status: Never Used   Substance and Sexual Activity    Alcohol use: No    Drug use: No    Sexual activity: Not on file   Other Topics Concern    Not on file   Social History Narrative    Not on file     Social Determinants of Health     Financial Resource Strain: Not on file   Food Insecurity: Not on file   Transportation Needs: Not on file   Physical Activity: Not on file   Stress: Not on file   Social Connections: Not on file   Interpersonal Safety: Low Risk  (1/23/2024)    Interpersonal Safety     Do you feel physically and emotionally safe where you currently live?: Yes     Within the past 12 months, have you been hit, slapped, kicked or otherwise physically hurt by someone?: No     Within the past 12 months, have you been humiliated or emotionally abused in other ways by your partner or ex-partner?: No   Housing Stability: Not on file       Outpatient Encounter Medications as of 7/23/2024   Medication Sig Dispense Refill    albuterol (PROAIR HFA/PROVENTIL HFA/VENTOLIN HFA) 108 (90 Base) MCG/ACT inhaler Inhale 2 puffs into the lungs every 6 hours as needed for shortness of breath, wheezing or cough 18 g 0    amphetamine-dextroamphetamine (ADDERALL XR) 10 MG 24 hr capsule Take 1 capsule (10 mg) by mouth daily 30 capsule 0    amphetamine-dextroamphetamine (ADDERALL XR) 20 MG 24 hr capsule Take 1 capsule (20 mg) by mouth daily 30 capsule 0     amphetamine-dextroamphetamine (ADDERALL XR) 30 MG 24 hr capsule Take 1 capsule (30 mg) by mouth daily 30 capsule 0    benzonatate (TESSALON) 200 MG capsule Take 1 capsule (200 mg) by mouth 3 times daily as needed for cough 30 capsule 0    Clascoterone 1 % CREA Externally apply 1 Application topically 2 times daily To face 30 g 6    clindamycin (CLEOCIN T) 1 % external lotion Apply once daily in the morning. 60 mL 6    COMPOUNDED NON-CONTROLLED SUBSTANCE (CMPD RX) - PHARMACY TO MIX COMPOUNDED MEDICATION Dispense: spironolactone 5% cream: apply once to twice daily to face. 30 g 11    gabapentin (NEURONTIN) 100 MG capsule Take 100 mg by mouth at bedtime      methocarbamol (ROBAXIN) 500 MG tablet Take 1.5 tablets (750 mg) by mouth 3 times daily 20 tablet 0    minoxidil (LONITEN) 2.5 MG tablet Take 1/2 tablet daily. 45 tablet 3    omeprazole (PRILOSEC) 20 MG capsule [OMEPRAZOLE (PRILOSEC) 20 MG CAPSULE] Take 20 mg by mouth daily before breakfast.      prenatal vitamin iron-folic acid 27mg-0.8mg (PRENATAL S) 27 mg iron- 800 mcg Tab tablet Take 1 tablet by mouth daily      Probiotic Product (PROBIOTIC-10) CHEW       tretinoin (RETIN-A) 0.025 % external cream Apply a pea-sized amount evenly over the face at nighttime before bed 45 g 11    VITAMIN D-VITAMIN K PO        Facility-Administered Encounter Medications as of 7/23/2024   Medication Dose Route Frequency Provider Last Rate Last Admin    medroxyPROGESTERone (DEPO-PROVERA) injection 150 mg  150 mg Intramuscular Q90 Days Karen John APRN CNP   150 mg at 06/24/24 1333             O:   NAD, WDWN, Alert & Oriented, Mood & Affect wnl, Vitals stable   Here today alone   There were no vitals taken for this visit.   General appearance normal   Vitals stable   Alert, oriented and in no acute distress      Inflammatory papules, comedones on face    Nonscarring hair loss consistent with androgenetic alopecia      Eyes: Conjunctivae/lids:Normal     ENT: Lips:  normal    MSK:Normal    Pulm: Breathing Normal    Neuro/Psych: Orientation:Alert and Orientedx3 ; Mood/Affect:normal     A/P:  1. Acne vulgaris/ Hirsutism.   Continue bpo wash.   Continue clindamycin in the morning.   Use daily moisturizer with sunscreen.   Continue adapalene at bedtime.   Apply miosturizer at bedtime.   Increase spironolactone cream to twice daily.   2. Androgenetic alopecia  Discussed treat is to preserve current hair.   Start topical minoxidil 5% solution or foam daily.   Pictures taken.  Recheck in 6 months.   3. Chronic urticaria  Dermatographism.   Continue zyrtec daily can increase to twice daily for breakouts.   Check shukri, tsh, cbc, and cmp.

## 2024-07-24 NOTE — ED NOTES
Pt reports dizziness and nausea since 0800. Feeling off balanced when ambulating. Provider to triage Tier one stroke code called . Pt to CT.

## 2024-07-24 NOTE — TELEPHONE ENCOUNTER
"Nurse Triage SBAR    Is this a 2nd Level Triage? YES, LICENSED PRACTITIONER REVIEW IS REQUIRED    Situation: Patient calling in with readings 160/95 155/100 pulse in 120s    Background:     Assessment: Patient feeling dizzy and having blurry vision     Protocol Recommended Disposition:   Call ADS/Go to ED/UCC Now (Or To Office with PCP Approval)    Recommendation: Patient going to ED     Routed to provider    Does the patient meet one of the following criteria for ADS visit consideration? No      Reason for Disposition   Systolic BP >= 160 OR Diastolic >= 100, and any cardiac or neurologic symptoms (e.g., chest pain, difficulty breathing, unsteady gait, blurred vision)    Answer Assessment - Initial Assessment Questions  1. BLOOD PRESSURE: \"What is the blood pressure?\" \"Did you take at least two measurements 5 minutes apart?\"      155/100 pulse 120  2. ONSET: \"When did you take your blood pressure?\"      Today   3. HOW: \"How did you obtain the blood pressure?\" (e.g., visiting nurse, automatic home BP monitor)  BP monitor  4. HISTORY: \"Do you have a history of high blood pressure?\"        5. MEDICATIONS: \"Are you taking any medications for blood pressure?\" \"Have you missed any doses recently?\"        6. OTHER SYMPTOMS: \"Do you have any symptoms?\" (e.g., headache, chest pain, blurred vision, difficulty breathing, weakness)      Patient feeling dizzy and blurred vision   7. PREGNANCY: \"Is there any chance you are pregnant?\" \"When was your last menstrual period?\"      *No Answer*    Protocols used: High Blood Pressure-A-OH    "

## 2024-07-24 NOTE — DISCHARGE INSTRUCTIONS
I recommend follow-up with [facility] and your primary care doctor.  I did create a referral to vestibular physical therapy.  Recommend meclizine 3 times daily as needed for vertigo.  Recommend Zofran every 8 hours needed for vertigo.  For breakthrough symptoms use Ativan every 6 hours.  Return to the ER for worsening symptom   You can access the FollowMyHealth Patient Portal offered by [facility] by registering at the following website: [email or web address]. By joining [facility]’s FollowMyHealth portal, you will also be able to view your health information using other applications (apps) compatible with our system.

## 2024-07-24 NOTE — ED TRIAGE NOTES
"Pt arrives per w/c , she reports feeling 'off\" since getting up this morning. Notes dizziness and nausea that is worse when she turns her head since 0800 this morning. Feeling \"off balanced\" when amb. Provider to triage tier one stroke code called. Pt to CT.        "

## 2024-07-24 NOTE — CONSULTS
"[facility]     Stroke Code Note          History of Present Illness     Chief Complaint: Dizziness      Lorraine Harris is a 39 year old female with PMH of ADHD. She presents to the ED for evaluation of dizziness. She reports that she awoke at 0545 feeling somewhat fatigued, but otherwise normal. Then around 0800 she noticed onset of dizziness and blurred vision while trying to use the computer. Symptoms worsened to a feeling \"like I was on a boat\" and she felt unsteady with walking. 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 In addition she notes brief episodes of self resolving chest pain and paresthesia of all distal extremities over the past few weeks.    On exam NIH=0. She does feel symptoms are present both at rest and with movement/activity. She feels symptoms are gradually worsening. She endorses blurred vision in both eyes but no visual field cut or diplopia. No headache.            Past Medical History     Stroke risk factors: none    Preadmission antithrombotic regimen: none    Modified Celina Score (Pre-morbid)  0-No deficits                   Assessment and Plan       1.  Dizziness, blurred vision, imbalance. Hyperacute MRI negative for stroke-suspect peripheral etiology      Intravenous Thrombolysis  Not given due to:   - stroke mimic: peripheral vertigo     Endovascular Treatment  Not initiated due to absence of proximal vessel occlusion     Plan:  -management of peripheral vertigo per ED team      ___________________________________________________________________    SIDDHARTHA Davila [facility]  Vascular Neurology    To page me or covering stroke neurology team member, click here: [facility]COM  Choose \"On Call\" tab at top, then select \"NEUROLOGY/ALL SITES\" from middle drop-down box, press Enter, then look for \"stroke\" or \"telestroke\" for your " site.  ___________________________________________________________________        Imaging/Labs   (personally reviewed CT/CTA, MRI)    CT head: no hemorrhage or other acute findings  CTA head/neck: No LVO, significant stenosis or dissection   Hyperacute MRI: no infarct or other acute abnormalities        Physical Examination     BP: (!) 180/87   Pulse: 100   Resp: 16   Temp: 98.8  F (37.1  C)       SpO2: 97 %            Wt Readings from Last 2 Encounters:   02/19/24 84.4 kg (186 lb)   02/14/24 84.5 kg (186 lb 3.2 oz)       General Exam  General:  patient lying in bed without any acute distress    HEENT:  normocephalic/atraumatic  Pulmonary:  no respiratory distress      Neuro Exam  Mental Status:  alert, oriented x 3, follows commands, speech clear and fluent, naming and repetition normal  Cranial Nerves:  visual fields intact (tested by nurse), EOMI with normal smooth pursuit, facial sensation intact and symmetric (tested by nurse), facial movements symmetric, hearing not formally tested but intact to conversation, no dysarthria, shoulder shrug equal bilaterally, tongue protrusion midline  Motor:  no abnormal movements, able to move all limbs antigravity spontaneously with no signs of hemiparesis observed, no pronator drift   Reflexes:  unable to test (telestroke)  Sensory:  light touch sensation intact and symmetric throughout upper and lower extremities (assessed by nurse), no extinction on double simultaneous stimulation (assessed by nurse)  Coordination:  normal finger-to-nose and heel-to-shin bilaterally without dysmetria  Station/Gait:  unable to test due to telestroke        Stroke Scales       NIHSS  1a. Level of Consciousness 0-->Alert, keenly responsive   1b. LOC Questions 0-->Answers both questions correctly   1c. LOC Commands 0-->Performs both tasks correctly   2.   Best Gaze 0-->Normal   3.   Visual 0-->No visual loss   4.   Facial Palsy 0-->Normal symmetrical movements   5a. Motor Arm, Left 0-->No  drift, limb holds 90 (or 45) degrees for full 10 secs   5b. Motor Arm, Right 0-->No drift, limb holds 90 (or 45) degrees for full 10 secs   6a. Motor Leg, Left 0-->No drift, leg holds 30 degree position for full 5 secs   6b. Motor Leg, right 0-->No drift, leg holds 30 degree position for full 5 secs   7.   Limb Ataxia 0-->Absent   8.   Sensory 0-->Normal, no sensory loss   9.   Best Language 0-->No aphasia, normal   10. Dysarthria 0-->Normal   11. Extinction and Inattention  0-->No abnormality   Total 0 (07/24/24 1116)            Labs     CBC  Lab Results   Component Value Date    HGB 12.7 07/23/2024    HCT 40.1 07/23/2024    WBC 7.1 07/23/2024     07/23/2024       BMP  Lab Results   Component Value Date     07/23/2024    POTASSIUM 4.0 07/23/2024    CHLORIDE 108 (H) 07/23/2024    CO2 20 (L) 07/23/2024    BUN 10.8 07/23/2024    CR 0.79 07/23/2024     (H) 07/23/2024    SAVI 9.5 07/23/2024       INR  INR   Date Value Ref Range Status   06/16/2021 0.95 0.90 - 1.10 Final   10/16/2018 0.97 0.90 - 1.10 Final   10/15/2018 0.94 0.90 - 1.10 Final       Data   Stroke Code Data  (for stroke code with tele)  Stroke code activated 07/24/24  1100   First stroke provider response 07/24/24  1102   Video start time 07/24/24  1108   Video end time 07/24/24  1128   Last known normal 07/24/24  0800   Time of discovery (or onset of symptoms)  07/24/24  0800   Head CT read by Stroke Neuro Provider 07/24/24  1107   Was stroke code de-escalated? Yes  07/24/24  1215     Telestroke Service Details  Type of service telemedicine diagnostic assessment of acute neurological changes   Reason telemedicine is appropriate patient requires assessment with a specialist for diagnosis and treatment of neurological symptoms   Mode of transmission secure interactive audio and video communication per Adelaida   Originating site (patient location) [facility]    Distant site (provider location) [facility]  [facility]        Clinically Significant Risk Factors Present on Admission                  # Hypertension: Home medication list includes antihypertensive(s)                       Time Spent on this Encounter   Billing: I personally examined and evaluated the patient today. At the time of my evaluation and management the patient was critical condition today due to stroke code. I personally managed stroke code. Key decisions made today included need for advanced neuroimaging and/or infication for acute stroke treatments. I spent a total of 60 minutes providing critical care services, evaluating the patient, directing care and reviewing laboratory values and radiologic reports.

## 2024-07-24 NOTE — Clinical Note
Lorraine Harris was seen and treated in our emergency department on 7/24/2024.  She may return to work on 07/27/2024.       If you have any questions or concerns, please don't hesitate to call.      Palmer Stockton MD

## 2024-08-12 LAB
ATRIAL RATE - MUSE: 94 BPM
DIASTOLIC BLOOD PRESSURE - MUSE: 71 MMHG
INTERPRETATION ECG - MUSE: NORMAL
P AXIS - MUSE: -10 DEGREES
PR INTERVAL - MUSE: 144 MS
QRS DURATION - MUSE: 80 MS
QT - MUSE: 362 MS
QTC - MUSE: 452 MS
R AXIS - MUSE: 85 DEGREES
SYSTOLIC BLOOD PRESSURE - MUSE: 131 MMHG
T AXIS - MUSE: -16 DEGREES
VENTRICULAR RATE- MUSE: 94 BPM

## 2024-08-15 ENCOUNTER — MYC REFILL (OUTPATIENT)
Dept: FAMILY MEDICINE | Facility: CLINIC | Age: 39
End: 2024-08-15
Payer: COMMERCIAL

## 2024-08-15 DIAGNOSIS — F90.2 ATTENTION DEFICIT HYPERACTIVITY DISORDER (ADHD), COMBINED TYPE: ICD-10-CM

## 2024-08-15 RX ORDER — DEXTROAMPHETAMINE SACCHARATE, AMPHETAMINE ASPARTATE MONOHYDRATE, DEXTROAMPHETAMINE SULFATE AND AMPHETAMINE SULFATE 7.5; 7.5; 7.5; 7.5 MG/1; MG/1; MG/1; MG/1
30 CAPSULE, EXTENDED RELEASE ORAL DAILY
Qty: 30 CAPSULE | Refills: 0 | Status: SHIPPED | OUTPATIENT
Start: 2024-08-15 | End: 2024-09-13

## 2024-09-13 ENCOUNTER — MYC REFILL (OUTPATIENT)
Dept: FAMILY MEDICINE | Facility: CLINIC | Age: 39
End: 2024-09-13

## 2024-09-13 ENCOUNTER — ALLIED HEALTH/NURSE VISIT (OUTPATIENT)
Dept: FAMILY MEDICINE | Facility: CLINIC | Age: 39
End: 2024-09-13
Payer: COMMERCIAL

## 2024-09-13 DIAGNOSIS — Z30.9 CONTRACEPTION MANAGEMENT: Primary | ICD-10-CM

## 2024-09-13 DIAGNOSIS — F90.2 ATTENTION DEFICIT HYPERACTIVITY DISORDER (ADHD), COMBINED TYPE: ICD-10-CM

## 2024-09-13 PROCEDURE — 96372 THER/PROPH/DIAG INJ SC/IM: CPT | Performed by: NURSE PRACTITIONER

## 2024-09-13 PROCEDURE — 99207 PR NO CHARGE NURSE ONLY: CPT

## 2024-09-13 RX ORDER — DEXTROAMPHETAMINE SACCHARATE, AMPHETAMINE ASPARTATE MONOHYDRATE, DEXTROAMPHETAMINE SULFATE AND AMPHETAMINE SULFATE 7.5; 7.5; 7.5; 7.5 MG/1; MG/1; MG/1; MG/1
30 CAPSULE, EXTENDED RELEASE ORAL DAILY
Qty: 30 CAPSULE | Refills: 0 | Status: SHIPPED | OUTPATIENT
Start: 2024-09-13

## 2024-09-13 RX ADMIN — MEDROXYPROGESTERONE ACETATE 150 MG: 150 INJECTION, SUSPENSION INTRAMUSCULAR at 12:55

## 2024-09-13 NOTE — PROGRESS NOTES

## 2024-10-17 ENCOUNTER — MYC REFILL (OUTPATIENT)
Dept: FAMILY MEDICINE | Facility: CLINIC | Age: 39
End: 2024-10-17
Payer: COMMERCIAL

## 2024-10-17 DIAGNOSIS — F90.2 ATTENTION DEFICIT HYPERACTIVITY DISORDER (ADHD), COMBINED TYPE: ICD-10-CM

## 2024-10-17 RX ORDER — DEXTROAMPHETAMINE SACCHARATE, AMPHETAMINE ASPARTATE MONOHYDRATE, DEXTROAMPHETAMINE SULFATE AND AMPHETAMINE SULFATE 7.5; 7.5; 7.5; 7.5 MG/1; MG/1; MG/1; MG/1
30 CAPSULE, EXTENDED RELEASE ORAL DAILY
Qty: 30 CAPSULE | Refills: 0 | Status: SHIPPED | OUTPATIENT
Start: 2024-10-17

## 2024-10-22 ENCOUNTER — TELEPHONE (OUTPATIENT)
Dept: FAMILY MEDICINE | Facility: CLINIC | Age: 39
End: 2024-10-22
Payer: COMMERCIAL

## 2024-10-22 DIAGNOSIS — R05.9 COUGH, UNSPECIFIED TYPE: Primary | ICD-10-CM

## 2024-10-22 NOTE — TELEPHONE ENCOUNTER
Patient calling to get herself and two of her children on for a lab visit tomorrow for a swab, orders are in.    Yazan Centeno RN

## 2024-10-23 ENCOUNTER — LAB (OUTPATIENT)
Dept: LAB | Facility: CLINIC | Age: 39
End: 2024-10-23
Payer: COMMERCIAL

## 2024-10-23 DIAGNOSIS — R05.9 COUGH, UNSPECIFIED TYPE: ICD-10-CM

## 2024-10-23 DIAGNOSIS — J02.9 SORE THROAT: Primary | ICD-10-CM

## 2024-10-23 LAB
DEPRECATED S PYO AG THROAT QL EIA: NEGATIVE
FLUAV RNA SPEC QL NAA+PROBE: NEGATIVE
FLUBV RNA RESP QL NAA+PROBE: NEGATIVE
GROUP A STREP BY PCR: NOT DETECTED
RSV RNA SPEC NAA+PROBE: NEGATIVE
SARS-COV-2 RNA RESP QL NAA+PROBE: NEGATIVE

## 2024-10-23 PROCEDURE — 87637 SARSCOV2&INF A&B&RSV AMP PRB: CPT

## 2024-10-23 PROCEDURE — 87651 STREP A DNA AMP PROBE: CPT | Performed by: FAMILY MEDICINE

## 2024-11-16 ENCOUNTER — MYC REFILL (OUTPATIENT)
Dept: FAMILY MEDICINE | Facility: CLINIC | Age: 39
End: 2024-11-16
Payer: COMMERCIAL

## 2024-11-16 DIAGNOSIS — F90.2 ATTENTION DEFICIT HYPERACTIVITY DISORDER (ADHD), COMBINED TYPE: ICD-10-CM

## 2024-11-18 RX ORDER — DEXTROAMPHETAMINE SACCHARATE, AMPHETAMINE ASPARTATE MONOHYDRATE, DEXTROAMPHETAMINE SULFATE AND AMPHETAMINE SULFATE 7.5; 7.5; 7.5; 7.5 MG/1; MG/1; MG/1; MG/1
30 CAPSULE, EXTENDED RELEASE ORAL DAILY
Qty: 30 CAPSULE | Refills: 0 | Status: SHIPPED | OUTPATIENT
Start: 2024-11-18

## 2024-11-26 ENCOUNTER — MYC REFILL (OUTPATIENT)
Dept: DERMATOLOGY | Facility: CLINIC | Age: 39
End: 2024-11-26
Payer: COMMERCIAL

## 2024-11-26 DIAGNOSIS — L70.0 ACNE VULGARIS: ICD-10-CM

## 2024-11-26 DIAGNOSIS — L70.9 ACNE, UNSPECIFIED ACNE TYPE: ICD-10-CM

## 2024-11-26 RX ORDER — CLINDAMYCIN PHOSPHATE 10 UG/ML
LOTION TOPICAL
Qty: 60 ML | Refills: 2 | Status: SHIPPED | OUTPATIENT
Start: 2024-11-26

## 2024-11-26 NOTE — TELEPHONE ENCOUNTER
Last Derm visit 7/23/24. RTC 6 months advised. No follow up appointment currently scheduled.    Per note:   Acne vulgaris/ Hirsutism.   Continue bpo wash.   Continue clindamycin in the morning.   Use daily moisturizer with sunscreen.   Continue adapalene at bedtime.   Apply miosturizer at bedtime.   Increase spironolactone cream to twice daily.     Refilled 90 day supply. Sent Realtime[employer] message notifying patient to schedule an appointment for further refills.    Melinda Guardado RN on 11/26/2024 at 8:46 AM

## 2024-12-01 ENCOUNTER — HEALTH MAINTENANCE LETTER (OUTPATIENT)
Age: 39
End: 2024-12-01

## 2024-12-12 ENCOUNTER — ALLIED HEALTH/NURSE VISIT (OUTPATIENT)
Dept: FAMILY MEDICINE | Facility: CLINIC | Age: 39
End: 2024-12-12
Payer: COMMERCIAL

## 2024-12-12 DIAGNOSIS — Z30.42 DEPO-PROVERA CONTRACEPTIVE STATUS: Primary | ICD-10-CM

## 2024-12-12 RX ORDER — MEDROXYPROGESTERONE ACETATE 150 MG/ML
150 INJECTION, SUSPENSION INTRAMUSCULAR
Status: ACTIVE | OUTPATIENT
Start: 2024-12-12 | End: 2025-12-07

## 2024-12-12 RX ADMIN — MEDROXYPROGESTERONE ACETATE 150 MG: 150 INJECTION, SUSPENSION INTRAMUSCULAR at 09:43

## 2024-12-12 NOTE — PROGRESS NOTES
Clinic Administered Medication Documentation      Depo Provera Documentation    Depo-Provera Standing Order inclusion/exclusion criteria reviewed.     Is this the initial or subsequent dose of Depo Provera? Subsequent dose - patient is within the acceptable window of time (11-15 weeks) for subsequent injection. Pregnancy test not indicated.    Patient meets: inclusion criteria     Is there an active order (written within the past 365 days, with administrations remaining, not ) in the chart? Yes.     Prior to injection, verified patient identity using patient's name and date of birth. Medication was administered. Please see MAR and medication order for additional information.     Vial/Syringe: Single dose vial. Was entire vial of medication used? Yes    Patient instructed to remain in clinic for 15 minutes and report any adverse reaction to staff immediately.  NEXT INJECTION DUE: 25 - 3/27/25    Verified that the patient has refills remaining in their prescription.

## 2024-12-16 ENCOUNTER — MYC REFILL (OUTPATIENT)
Dept: FAMILY MEDICINE | Facility: CLINIC | Age: 39
End: 2024-12-16
Payer: COMMERCIAL

## 2024-12-16 DIAGNOSIS — F90.2 ATTENTION DEFICIT HYPERACTIVITY DISORDER (ADHD), COMBINED TYPE: ICD-10-CM

## 2024-12-16 RX ORDER — DEXTROAMPHETAMINE SACCHARATE, AMPHETAMINE ASPARTATE MONOHYDRATE, DEXTROAMPHETAMINE SULFATE AND AMPHETAMINE SULFATE 7.5; 7.5; 7.5; 7.5 MG/1; MG/1; MG/1; MG/1
30 CAPSULE, EXTENDED RELEASE ORAL DAILY
Qty: 30 CAPSULE | Refills: 0 | Status: SHIPPED | OUTPATIENT
Start: 2024-12-16

## 2025-01-23 ENCOUNTER — MYC REFILL (OUTPATIENT)
Dept: DERMATOLOGY | Facility: CLINIC | Age: 40
End: 2025-01-23
Payer: COMMERCIAL

## 2025-01-23 DIAGNOSIS — L70.0 ACNE VULGARIS: ICD-10-CM

## 2025-02-15 ENCOUNTER — MYC REFILL (OUTPATIENT)
Dept: FAMILY MEDICINE | Facility: CLINIC | Age: 40
End: 2025-02-15
Payer: COMMERCIAL

## 2025-02-15 ENCOUNTER — HEALTH MAINTENANCE LETTER (OUTPATIENT)
Age: 40
End: 2025-02-15

## 2025-02-15 DIAGNOSIS — F90.2 ATTENTION DEFICIT HYPERACTIVITY DISORDER (ADHD), COMBINED TYPE: ICD-10-CM

## 2025-02-17 RX ORDER — DEXTROAMPHETAMINE SACCHARATE, AMPHETAMINE ASPARTATE MONOHYDRATE, DEXTROAMPHETAMINE SULFATE AND AMPHETAMINE SULFATE 7.5; 7.5; 7.5; 7.5 MG/1; MG/1; MG/1; MG/1
30 CAPSULE, EXTENDED RELEASE ORAL DAILY
Qty: 30 CAPSULE | Refills: 0 | Status: SHIPPED | OUTPATIENT
Start: 2025-02-17

## 2025-03-14 ENCOUNTER — MYC REFILL (OUTPATIENT)
Dept: FAMILY MEDICINE | Facility: CLINIC | Age: 40
End: 2025-03-14
Payer: COMMERCIAL

## 2025-03-14 DIAGNOSIS — F90.2 ATTENTION DEFICIT HYPERACTIVITY DISORDER (ADHD), COMBINED TYPE: ICD-10-CM

## 2025-03-15 RX ORDER — DEXTROAMPHETAMINE SACCHARATE, AMPHETAMINE ASPARTATE MONOHYDRATE, DEXTROAMPHETAMINE SULFATE AND AMPHETAMINE SULFATE 7.5; 7.5; 7.5; 7.5 MG/1; MG/1; MG/1; MG/1
30 CAPSULE, EXTENDED RELEASE ORAL DAILY
Qty: 30 CAPSULE | Refills: 0 | Status: SHIPPED | OUTPATIENT
Start: 2025-03-15

## 2025-03-19 ENCOUNTER — E-VISIT (OUTPATIENT)
Dept: URGENT CARE | Facility: CLINIC | Age: 40
End: 2025-03-19
Payer: COMMERCIAL

## 2025-03-19 ENCOUNTER — LAB (OUTPATIENT)
Dept: LAB | Facility: CLINIC | Age: 40
End: 2025-03-19
Attending: NURSE PRACTITIONER
Payer: COMMERCIAL

## 2025-03-19 DIAGNOSIS — J06.9 ACUTE UPPER RESPIRATORY INFECTION, UNSPECIFIED: Primary | ICD-10-CM

## 2025-03-19 DIAGNOSIS — J06.9 ACUTE UPPER RESPIRATORY INFECTION, UNSPECIFIED: ICD-10-CM

## 2025-03-19 DIAGNOSIS — R11.0 NAUSEA: ICD-10-CM

## 2025-03-19 DIAGNOSIS — U07.1 COVID: ICD-10-CM

## 2025-03-19 LAB
FLUAV AG SPEC QL IA: NEGATIVE
FLUBV AG SPEC QL IA: NEGATIVE

## 2025-03-19 PROCEDURE — 87804 INFLUENZA ASSAY W/OPTIC: CPT

## 2025-03-19 RX ORDER — ONDANSETRON 4 MG/1
4 TABLET, ORALLY DISINTEGRATING ORAL EVERY 8 HOURS PRN
Qty: 6 TABLET | Refills: 0 | Status: SHIPPED | OUTPATIENT
Start: 2025-03-19

## 2025-03-19 NOTE — PATIENT INSTRUCTIONS
Dear Lorraine,    After reviewing your responses, I would like you to come in for a swab to make sure we treat you correctly. This swab is to evaluate you for possible COVID and Flu, and should be scheduled for today or tomorrow. Please use the Schedule Now button in [facility] to schedule your swab. Otherwise, click this link to schedule a lab only appointment.    Lab appointments are not available at most locations on the weekends. If no Lab Only appointment is available, you should be seen in any of our convenient Urgent Care Centers for an in person visit, which can be found on our website here.    You will receive instructions with your results in [facility] once they are available.     If your symptoms worsen, you develop difficulty breathing, difficulty with drinking enough to stay hydrated, or fevers for more than 5 days, please contact your primary care provider for an appointment or visit an Urgent Care Center to be seen.      Thanks again for choosing us as your health care partner.   Isabel Charles, CNP    I also sent in an rx for zofran to help with the nausea

## 2025-03-20 ENCOUNTER — TELEPHONE (OUTPATIENT)
Dept: FAMILY MEDICINE | Facility: CLINIC | Age: 40
End: 2025-03-20

## 2025-03-20 LAB — SARS-COV-2 RNA RESP QL NAA+PROBE: POSITIVE

## 2025-03-20 NOTE — TELEPHONE ENCOUNTER
General Call      Reason for Call:  patient called in to see what was sent to pharmacy. I told her the paxlovid was sent to the pharmacy one minute before we started talking on the phone. She had no further questions.

## 2025-03-20 NOTE — TELEPHONE ENCOUNTER
Note: Patient is COVID-positive.  She is requesting Paxlovid.  She has normal renal function.  No drug drug interaction concerns.  Paxlovid ordered.  YADIRA Whatley MD       provider E-Visit time total (minutes):  Less than 5 minutes.

## 2025-04-01 ENCOUNTER — OFFICE VISIT (OUTPATIENT)
Dept: FAMILY MEDICINE | Facility: CLINIC | Age: 40
End: 2025-04-01
Payer: COMMERCIAL

## 2025-04-01 VITALS
DIASTOLIC BLOOD PRESSURE: 77 MMHG | WEIGHT: 189.6 LBS | HEART RATE: 78 BPM | HEIGHT: 62 IN | OXYGEN SATURATION: 98 % | RESPIRATION RATE: 16 BRPM | SYSTOLIC BLOOD PRESSURE: 132 MMHG | BODY MASS INDEX: 34.89 KG/M2

## 2025-04-01 DIAGNOSIS — E66.811 CLASS 1 OBESITY WITH SERIOUS COMORBIDITY AND BODY MASS INDEX (BMI) OF 34.0 TO 34.9 IN ADULT, UNSPECIFIED OBESITY TYPE: Primary | ICD-10-CM

## 2025-04-01 DIAGNOSIS — F90.2 ATTENTION DEFICIT HYPERACTIVITY DISORDER (ADHD), COMBINED TYPE: ICD-10-CM

## 2025-04-01 DIAGNOSIS — F50.819 BINGE EATING DISORDER, UNSPECIFIED SEVERITY: ICD-10-CM

## 2025-04-01 LAB
EST. AVERAGE GLUCOSE BLD GHB EST-MCNC: 111 MG/DL
HBA1C MFR BLD: 5.5 % (ref 0–5.6)

## 2025-04-01 PROCEDURE — 36415 COLL VENOUS BLD VENIPUNCTURE: CPT | Performed by: FAMILY MEDICINE

## 2025-04-01 PROCEDURE — 83036 HEMOGLOBIN GLYCOSYLATED A1C: CPT | Performed by: FAMILY MEDICINE

## 2025-04-01 PROCEDURE — 99215 OFFICE O/P EST HI 40 MIN: CPT | Performed by: FAMILY MEDICINE

## 2025-04-01 PROCEDURE — 80053 COMPREHEN METABOLIC PANEL: CPT | Performed by: FAMILY MEDICINE

## 2025-04-01 PROCEDURE — 84443 ASSAY THYROID STIM HORMONE: CPT | Performed by: FAMILY MEDICINE

## 2025-04-01 PROCEDURE — 99417 PROLNG OP E/M EACH 15 MIN: CPT | Performed by: FAMILY MEDICINE

## 2025-04-01 PROCEDURE — G2211 COMPLEX E/M VISIT ADD ON: HCPCS | Performed by: FAMILY MEDICINE

## 2025-04-01 PROCEDURE — 3078F DIAST BP <80 MM HG: CPT | Performed by: FAMILY MEDICINE

## 2025-04-01 PROCEDURE — 3075F SYST BP GE 130 - 139MM HG: CPT | Performed by: FAMILY MEDICINE

## 2025-04-01 PROCEDURE — 80061 LIPID PANEL: CPT | Performed by: FAMILY MEDICINE

## 2025-04-01 RX ORDER — LORATADINE 10 MG/1
10 TABLET ORAL DAILY
COMMUNITY

## 2025-04-01 NOTE — PROGRESS NOTES
"    New Medical Weight Management Consult    PATIENT:  Lorraine Harris  MRN:         [id number]  :         1985  DANTE:         2025        I had the pleasure of seeing Lorraine Harris. Full intake/assessment was done to determine barriers to weight loss success and develop a treatment plan. Lorraine Harris is a 40 year old female interested in treatment of medical problems associated with excess weight. She has a height of 5' 2\", a weight of 189 lbs 9.6 oz, and the calculated Body mass index is 34.68 kg/m .    ASSESSMENT/PLAN:  {ASSESSMENT/PLAN:383130}      She has a long-term goal of overall just feeling more comfortable in her skin and close fitting better.  As far as short-term goals, she is lary focus on trying to increase her proteins and decreased processed foods.  She does meet criteria for adult binge eating disorder so we felt potentially tracking might trigger some binge eating.  She is fairly active and most days gets more than 10,000 steps just from her day-to-day.  She feels like exercise again would make her feel obsessive.    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  When she was younger she felt like she could really eat anything she wanted to.  Then she feels like she gained some extra weight with each of her pregnancies.  She feels like her biggest barrier to weight loss is her poor eating habits.  She did have some food insecurity growing up so feels like she always has to clean her plate and not waste food.  They have a busy lifestyle with 3 kids and lots of activities so they tend to be on the go a lot and grab convenience foods.    Dietary recall is as follows:  Breakfast: Occasionally eggs, but mostly pop tart on working days  Lunch: [place] or salad, leftovers, or skips  Dinner: Sometimes easy convenience foods, sometimes her  cooks.  She does feel like she gets adequate fruits and vegetables.  Snacks: Mainly at night, tends to be junk food.  She " "tries to not keep that in the house.  She does meet criteria for binge eating disorder and then she has at least 2 episodes of binging per week.  She does have a lot of cravings for sweet things like ice cream.    As far as activity, she gets 10,000 steps just in her daily life.  She does not like to exercise or think about exercise.  In the past when she has done regular exercise, she becomes obsessed.  She does have what seems like some OCD tendencies and tends to get over obsessed with certain things.  This is where I think the tracking might not be a good idea for her.  She takes Adderall for ADHD.  In the past she has been on multiple SSRIs but did not like how she felt on them.  She did like Wellbutrin but it caused sweating so she stopped taking it.  She was interested in talk about medications.  Her insurance does not cover any of the GLP-1's.  We reviewed options for her.  Phentermine would be relatively contraindicated as she is already on Adderall.  We could switch her to Vyvanse for the binge eating disorder coverage.  We also discussed metformin, topiramate.  She tends to have a lot of nausea so would probably not try naltrexone initially.    She would like to take some time and think about it and will get back to me.  She has the following co-morbidities:        3/31/2025     1:57 PM   --   I have the following health issues associated with obesity Infertility    GERD (Reflux)   I have the following symptoms associated with obesity Knee Pain    Infertility (Difficulty Getting Pregnant)    Depression    Back Pain    Fatigue    Hip Pain    Irregular Menstral Cycle            No data to display                    3/31/2025     1:57 PM   Referring Provider   Please name the provider who referred you to Medical Weight Management  If you do not know, please answer \"I Don't Know\" Dr Armando           3/31/2025     1:57 PM   Weight History   How concerned are you about your weight? Very Concerned   I became " overweight As an Adult   The following factors have contributed to my weight gain Mental Health Issues    Change in Schedule    Started on Medication that Caused Weight Gain    Eating Wrong Types of Food    Eating Too Much    Lack of Exercise    Genetic (Runs in the Family)    Stress    Other   Please list the other factors 1 ectopic pregnancy loss gained a lot of weight, 3 other pregnancy, 2 c sections, gallbladder surgery. Took anti depressants & anti anxiety meds  for many years that increased weight. Took fertility meds. Birth control   I have tried the following methods to lose weight Watching Portions or Calories    Exercise    Weight Watchers    Atkins-type Diet (Low Carb/High Protein)    Slim Fast or Other Liquid Diets    Meal Replacements    Other   Please list the other methods Gym memberships, meal planning, calorie counting, restricting foods, increase water, decreasing earing out and other unhealthy foods, walking.   My lowest weight since age 18 was 103   My highest weight since age 18 was 210   The most weight I have ever lost was (lbs) 30   I have the following family history of obesity/being overweight My father is overweight    Many of my relatives are overweight   How has your weight changed over the last year? Gained   How many pounds? 20 lb increase. Got down to about 168 last summer but now back up to 188           3/31/2025     1:57 PM   Diet Recall Review with Patient   If you do eat breakfast, what types of food do you eat? Toast, cereal, pop tart (on way to work), eggs   If you do eat lunch, what types of food do you typically eat? Yogurt/sandwich. when working 12 hr often dont get a break or get a very quick break to eat fast. Then starving when get gallito at 8p and eat a lot.  At home often skip lunch, busy and dont want to stop task. Then starving by 4/5p and hungry agsin at 8p   If you do eat supper, what types of food do you typically eat? Many things- rice/chicken, eggs, pasta, salad,  soups, veggies. Anything quick   If you do snack, what types of food do you typically eat? Fruits w/ranch, hummus/bread, chips/salsa, yogurt, chocolate, cookies   How many glasses of juice do you drink in a typical day? 0   How many of glasses of milk do you drink in a typical day? 1   If you do drink milk, what type? 1%   How many 8oz glasses of sugar containing drinks such as William-Aid/sweet tea do you drink in a day? 0   How many cans/bottles of sugar pop/soda/tea/sports drinks do you drink in a day? 1   How many cans/bottles of diet pop/soda/tea or sports drink do you drink in a day? 0   How often do you have a drink of alcohol? Never           3/31/2025     1:57 PM   Eating Habits   Generally, my meals include foods like these bread, pasta, rice, potatoes, corn, crackers, sweet dessert, pop, or juice Almost Everyday   Generally, my meals include foods like these fried meats, brats, burgers, french fries, pizza, cheese, chips, or ice cream Once a Week   Eat fast food (like McDonalds, Burger Naveen, Taco Bell) Less Than Weekly   Eat at a buffet or sit-down restaurant Less Than Weekly   Eat most of my meals in front of the TV or computer A Few Times a Week   Often skip meals, eat at random times, have no regular eating times A Few Times a Week   Rarely sit down for a meal but snack or graze throughout A Few Times a Week   Eat extra snacks between meals A Few Times a Week   Eat most of my food at the end of the day Almost Everyday   Eat in the middle of the night or wake up at night to eat Once a Week   Eat extra snacks to prevent or correct low blood sugar Less Than Weekly   Eat to prevent acid reflux or stomach pain Once a Week   Worry about not having enough food to eat Never   I eat when I am depressed A Few Times a Week   I eat when I am stressed A Few Times a Week   I eat when I am bored A Few Times a Week   I eat when I am anxious A Few Times a Week   I eat when I am happy or as a reward Less Than Weekly   I  feel hungry all the time even if I just have eaten Almost Everyday   Feeling full is important to me Almost Everyday   I finish all the food on my plate even if I am already full Almost Everyday   I can't resist eating delicious food or walk past the good food/smell Almost Everyday   I eat/snack without noticing that I am eating Almost Everyday   I eat when I am preparing the meal A Few Times a Week   I eat more than usual when I see others eating A Few Times a Week   I have trouble not eating sweets, ice cream, cookies, or chips if they are around the house Almost Everyday   I think about food all day Almost Everyday   What foods, if any, do you crave? Sweets/Candy/Chocolate   Please list any other foods you crave? chocolate/ice cream           3/31/2025     1:57 PM   Amount of Food   I feel out of control when eating Almost Everyday   I eat a large amount of food, like a loaf of bread, a box of cookies, a pint/quart of ice cream, all at once Monthly   I eat a large amount of food even when I am not hungry Weekly   I eat rapidly Never   I eat alone because I feel embarrassed and do not want others to see how much I have eaten Almost Everyday   I eat until I am uncomfortably full Almost Everyday   I feel bad, disgusted, or guilty after I overeat Everyday           3/31/2025     1:57 PM   Activity/Exercise History   How much of a typical 12 hour day do you spend sitting? Less Than Half the Day   How much of a typical 12 hour day do you spend lying down? Less Than Half the Day   How much of a typical day do you spend walking/standing? Most of the Day   How many hours (not including work) do you spend on the TV/Video Games/Computer/Tablet/Phone? 2-3 Hours   How many times a week are you active for the purpose of exercise? Never   What keeps you from being more active? Pain    Shortness of Breath    Lack of Time    Too tired    Unsure What To Do    Worried People Will Look At Me    Other   How many total minutes do you  spend doing some activity for the purpose of exercising when you exercise? Less Than 15 Minutes       PAST MEDICAL HISTORY:  Past Medical History:   Diagnosis Date    AMA (advanced maternal age) multigravida 35+ 06/17/2021    Anxiety     Depression     GERD (gastroesophageal reflux disease)     Mild or unspecified pre-eclampsia, unspecified as to episode of care     Created by Conversion     Pregnant state, incidental     Created by Conversion     Unspecified renal disease in pregnancy, unspecified as to episode of care(646.20)     Created by Conversion            3/31/2025     1:57 PM   Work/Social History Reviewed With Patient   My employment status is Full-Time   My job is Registered Nurse in acute care hospital   How much of your job is spent on the computer or phone? Less Than 50%   How many hours do you spend commuting to work daily? 1   What is your marital status? /In a Relationship   If in a relationship, is your significant other overweight? No   If you have children, are they overweight? No   Who do you live with?  and 3 children   Who does the food shopping? My  and myself           3/31/2025     1:57 PM   Mental Health History Reviewed With Patient   Have you ever been physically or sexually abused? No   How often in the past 2 weeks have you felt little interest or pleasure in doing things? For Several Days   Over the past 2 weeks how often have you felt down, depressed, or hopeless? For Several Days           3/31/2025     1:57 PM   Sleep History Reviewed With Patient   How many hours do you sleep at night? 4       MEDICATIONS:   Current Outpatient Medications   Medication Sig Dispense Refill    amphetamine-dextroamphetamine (ADDERALL XR) 30 MG 24 hr capsule Take 1 capsule (30 mg) by mouth daily. 30 capsule 0    clindamycin (CLEOCIN T) 1 % external lotion Apply once daily in the morning. 60 mL 2    COMPOUNDED NON-CONTROLLED SUBSTANCE (CMPD RX) - PHARMACY TO MIX COMPOUNDED  "MEDICATION Dispense: spironolactone 5% cream: apply once to twice daily to face. 30 g 2    loratadine (CLARITIN) 10 MG tablet Take 10 mg by mouth daily.      omeprazole (PRILOSEC) 20 MG capsule [OMEPRAZOLE (PRILOSEC) 20 MG CAPSULE] Take 20 mg by mouth daily before breakfast.      ondansetron (ZOFRAN ODT) 4 MG ODT tab Take 1 tablet (4 mg) by mouth every 8 hours as needed for nausea. 6 tablet 0    Probiotic Product (PROBIOTIC-10) CHEW       Clascoterone 1 % CREA Externally apply 1 Application topically 2 times daily To face 30 g 6    VITAMIN D-VITAMIN K PO  (Patient not taking: Reported on 4/1/2025)         ALLERGIES:   No Known Allergies    PHYSICAL EXAM:  /77   Pulse 78   Resp 16   Ht 1.575 m (5' 2\")   Wt 86 kg (189 lb 9.6 oz)   LMP  (LMP Unknown)   SpO2 98%   BMI 34.68 kg/m      Waist circumference:      Wt Readings from Last 4 Encounters:   04/01/25 86 kg (189 lb 9.6 oz)   07/24/24 79.4 kg (175 lb)   02/19/24 84.4 kg (186 lb)   02/14/24 84.5 kg (186 lb 3.2 oz)     A & O x 3  HEENT: NCAT, mucous membranes moist  Respirations unlabored  Location of obesity: Mixed Obesity    FOLLOW-UP:   12 weeks.    TIME: *** min spent on evaluation, management, counseling, education, & motivational interviewing with greater than 50 % of the total time was spent on counseling and coordinating care    Sincerely,    Jeannie Armando MD        " Readings from Last 4 Encounters:   04/01/25 86 kg (189 lb 9.6 oz)   07/24/24 79.4 kg (175 lb)   02/19/24 84.4 kg (186 lb)   02/14/24 84.5 kg (186 lb 3.2 oz)     A & O x 3  HEENT: NCAT, mucous membranes moist  Respirations unlabored  Location of obesity: Mixed Obesity    FOLLOW-UP:   12 weeks.    TIME: 65 min spent on evaluation, management, counseling, education, & motivational interviewing with greater than 50 % of the total time was spent on counseling and coordinating care    Sincerely,    Jeannie Armando MD

## 2025-04-02 LAB
ALBUMIN SERPL BCG-MCNC: 4.6 G/DL (ref 3.5–5.2)
ALP SERPL-CCNC: 83 U/L (ref 40–150)
ALT SERPL W P-5'-P-CCNC: 21 U/L (ref 0–50)
ANION GAP SERPL CALCULATED.3IONS-SCNC: 12 MMOL/L (ref 7–15)
AST SERPL W P-5'-P-CCNC: 16 U/L (ref 0–45)
BILIRUB SERPL-MCNC: 0.2 MG/DL
BUN SERPL-MCNC: 13 MG/DL (ref 6–20)
CALCIUM SERPL-MCNC: 9.4 MG/DL (ref 8.8–10.4)
CHLORIDE SERPL-SCNC: 105 MMOL/L (ref 98–107)
CHOLEST SERPL-MCNC: 249 MG/DL
CREAT SERPL-MCNC: 0.76 MG/DL (ref 0.51–0.95)
EGFRCR SERPLBLD CKD-EPI 2021: >90 ML/MIN/1.73M2
FASTING STATUS PATIENT QL REPORTED: NO
FASTING STATUS PATIENT QL REPORTED: NO
GLUCOSE SERPL-MCNC: 94 MG/DL (ref 70–99)
HCO3 SERPL-SCNC: 23 MMOL/L (ref 22–29)
HDLC SERPL-MCNC: 42 MG/DL
LDLC SERPL CALC-MCNC: 165 MG/DL
NONHDLC SERPL-MCNC: 207 MG/DL
POTASSIUM SERPL-SCNC: 4 MMOL/L (ref 3.4–5.3)
PROT SERPL-MCNC: 7.7 G/DL (ref 6.4–8.3)
SODIUM SERPL-SCNC: 140 MMOL/L (ref 135–145)
TRIGL SERPL-MCNC: 210 MG/DL
TSH SERPL DL<=0.005 MIU/L-ACNC: 0.56 UIU/ML (ref 0.3–4.2)

## 2025-04-13 ENCOUNTER — E-VISIT (OUTPATIENT)
Dept: URGENT CARE | Facility: CLINIC | Age: 40
End: 2025-04-13
Payer: COMMERCIAL

## 2025-04-13 DIAGNOSIS — R21 RASH: Primary | ICD-10-CM

## 2025-04-13 PROCEDURE — 99207 PR NON-BILLABLE SERV PER CHARTING: CPT | Performed by: FAMILY MEDICINE

## 2025-04-14 NOTE — PATIENT INSTRUCTIONS
Dear Lorraine Harris,    We are sorry you are not feeling well. Based on the responses you provided and the photo obtained, it is recommended that you be seen in-person in urgent care so we can better evaluate your symptoms. Please click here to find the nearest urgent care location to you.   You will not be charged for this Visit. Thank you for trusting us with your care.    SIDDHARTHA Jacobo CNP

## 2025-04-17 ENCOUNTER — MYC REFILL (OUTPATIENT)
Dept: FAMILY MEDICINE | Facility: CLINIC | Age: 40
End: 2025-04-17
Payer: COMMERCIAL

## 2025-04-17 DIAGNOSIS — F90.2 ATTENTION DEFICIT HYPERACTIVITY DISORDER (ADHD), COMBINED TYPE: ICD-10-CM

## 2025-04-17 RX ORDER — DEXTROAMPHETAMINE SACCHARATE, AMPHETAMINE ASPARTATE MONOHYDRATE, DEXTROAMPHETAMINE SULFATE AND AMPHETAMINE SULFATE 7.5; 7.5; 7.5; 7.5 MG/1; MG/1; MG/1; MG/1
30 CAPSULE, EXTENDED RELEASE ORAL DAILY
Qty: 30 CAPSULE | Refills: 0 | Status: SHIPPED | OUTPATIENT
Start: 2025-04-17

## 2025-04-22 ENCOUNTER — OFFICE VISIT (OUTPATIENT)
Dept: FAMILY MEDICINE | Facility: CLINIC | Age: 40
End: 2025-04-22
Payer: COMMERCIAL

## 2025-04-22 VITALS
SYSTOLIC BLOOD PRESSURE: 128 MMHG | DIASTOLIC BLOOD PRESSURE: 83 MMHG | WEIGHT: 188 LBS | HEART RATE: 91 BPM | RESPIRATION RATE: 16 BRPM | OXYGEN SATURATION: 95 % | TEMPERATURE: 98.3 F | HEIGHT: 62 IN | BODY MASS INDEX: 34.6 KG/M2

## 2025-04-22 DIAGNOSIS — L71.0 PERIORAL DERMATITIS: Primary | ICD-10-CM

## 2025-04-22 PROCEDURE — 3079F DIAST BP 80-89 MM HG: CPT | Performed by: FAMILY MEDICINE

## 2025-04-22 PROCEDURE — 3074F SYST BP LT 130 MM HG: CPT | Performed by: FAMILY MEDICINE

## 2025-04-22 PROCEDURE — 99213 OFFICE O/P EST LOW 20 MIN: CPT | Performed by: FAMILY MEDICINE

## 2025-04-22 RX ORDER — PIMECROLIMUS 10 MG/G
CREAM TOPICAL 2 TIMES DAILY
Qty: 30 G | Refills: 1 | Status: SHIPPED | OUTPATIENT
Start: 2025-04-22

## 2025-04-22 NOTE — PROGRESS NOTES
Assessment & Plan     1. Perioral dermatitis (Primary)  - pimecrolimus (ELIDEL) 1 % external cream; Apply topically 2 times daily.  Dispense: 30 g; Refill: 1     Atypical rash, perioral distribution but localized to the left side.  She has not experienced any vesicles or ulcerations, less suspect shingles versus HSV at this point.  Symptoms have fluctuated, waxing and waning.  Did not respond to topical antifungal.  Has used her acne control without improvement in symptoms.  Has used OTC hydrocortisone as well as a small supply of Kenalog cream.  Symptoms seem to continue fluctuating.    Reviewed differential diagnosis including perioral dermatitis versus atopic dermatitis versus psoriasis versus viral versus fungal versus bacterial infection.    I most suspect a variant of perioral dermatitis versus atopic dermatitis.  His steroids can exacerbate perioral dermatitis, and she is already using topical clindamycin antibiotic, recommend we trial pimecrolimus.  If symptoms not improving, next would trial oral antibiotic like doxycycline.  Request update by MyChart in a couple weeks.         Subjective   Lorraine is a 40 year old, presenting for the following health issues:  Derm Problem      4/22/2025     3:01 PM   Additional Questions   Roomed by Christian NUÑEZ MA     History of Present Illness       Reason for visit:  Dermatitis to left chin  Symptom onset:  3-4 weeks ago  Symptoms include:  Red bumpy, spreading, itching, cycling  Symptom intensity:  Moderate  Symptom progression:  Worsening  Had these symptoms before:  Yes  Has tried/received treatment for these symptoms:  Yes  Previous treatment was successful:  Yes  Prior treatment description:  Can t remember if it was pill or cream  What makes it worse:  Make up or lotions on it  What makes it better:  Differine cream seymour helps but then it gets worse again   She is taking medications regularly.            Review of Systems  See HPI above.       Objective    /83   " Pulse 91   Temp 98.3  F (36.8  C)   Resp 16   Ht 1.575 m (5' 2\")   Wt 85.3 kg (188 lb)   LMP  (LMP Unknown)   SpO2 95%   BMI 34.39 kg/m    Body mass index is 34.39 kg/m .  Physical Exam   GENERAL: alert and no distress  SKIN: left lower face (anterior lateral chin) with flesh colored bumps. Pictures reveal more of a thickened, scaly plaque.             Signed Electronically by: Christina Ugalde DO    "

## 2025-05-20 ENCOUNTER — MYC REFILL (OUTPATIENT)
Dept: FAMILY MEDICINE | Facility: CLINIC | Age: 40
End: 2025-05-20
Payer: COMMERCIAL

## 2025-05-20 DIAGNOSIS — F90.2 ATTENTION DEFICIT HYPERACTIVITY DISORDER (ADHD), COMBINED TYPE: ICD-10-CM

## 2025-05-20 RX ORDER — DEXTROAMPHETAMINE SACCHARATE, AMPHETAMINE ASPARTATE MONOHYDRATE, DEXTROAMPHETAMINE SULFATE AND AMPHETAMINE SULFATE 7.5; 7.5; 7.5; 7.5 MG/1; MG/1; MG/1; MG/1
30 CAPSULE, EXTENDED RELEASE ORAL DAILY
Qty: 30 CAPSULE | Refills: 0 | Status: SHIPPED | OUTPATIENT
Start: 2025-05-20

## 2025-06-11 ENCOUNTER — MYC REFILL (OUTPATIENT)
Dept: DERMATOLOGY | Facility: CLINIC | Age: 40
End: 2025-06-11
Payer: COMMERCIAL

## 2025-06-11 DIAGNOSIS — L70.9 ACNE, UNSPECIFIED ACNE TYPE: ICD-10-CM

## 2025-06-16 NOTE — TELEPHONE ENCOUNTER
Sent patient a reply in MyChart--she is wanting to schedule an apt. Can see KM on 8/5 at 9, this has been saved for her  Karla Lin RN

## 2025-06-17 ENCOUNTER — MYC REFILL (OUTPATIENT)
Dept: FAMILY MEDICINE | Facility: CLINIC | Age: 40
End: 2025-06-17
Payer: COMMERCIAL

## 2025-06-17 DIAGNOSIS — F90.2 ATTENTION DEFICIT HYPERACTIVITY DISORDER (ADHD), COMBINED TYPE: ICD-10-CM

## 2025-06-17 RX ORDER — CLINDAMYCIN PHOSPHATE 10 UG/ML
LOTION TOPICAL
Qty: 60 ML | Refills: 1 | Status: SHIPPED | OUTPATIENT
Start: 2025-06-17

## 2025-06-17 RX ORDER — DEXTROAMPHETAMINE SACCHARATE, AMPHETAMINE ASPARTATE MONOHYDRATE, DEXTROAMPHETAMINE SULFATE AND AMPHETAMINE SULFATE 7.5; 7.5; 7.5; 7.5 MG/1; MG/1; MG/1; MG/1
30 CAPSULE, EXTENDED RELEASE ORAL DAILY
Qty: 30 CAPSULE | Refills: 0 | Status: SHIPPED | OUTPATIENT
Start: 2025-06-17

## 2025-07-07 ENCOUNTER — MYC MEDICAL ADVICE (OUTPATIENT)
Dept: FAMILY MEDICINE | Facility: CLINIC | Age: 40
End: 2025-07-07
Payer: COMMERCIAL

## 2025-07-07 DIAGNOSIS — L71.0 PERIORAL DERMATITIS: Primary | ICD-10-CM

## 2025-07-08 RX ORDER — DOXYCYCLINE 100 MG/1
100 CAPSULE ORAL 2 TIMES DAILY
Qty: 60 CAPSULE | Refills: 1 | Status: SHIPPED | OUTPATIENT
Start: 2025-07-08

## 2025-07-08 NOTE — TELEPHONE ENCOUNTER
Perioral dermatitis (Primary)  - doxycycline hyclate (VIBRAMYCIN) 100 MG capsule; Take 1 capsule (100 mg) by mouth 2 times daily.  Dispense: 60 capsule; Refill: 1   - Adult Dermatology  Referral; Future     Step up therapy with addition of oral doxycycline.   If still not improving, would recommend consultation with dermatology.    Christina Ugalde, DO

## 2025-07-08 NOTE — TELEPHONE ENCOUNTER
"4/22/25 Office Visit note by Dr. Lezama states \"I most suspect a variant of perioral dermatitis versus atopic dermatitis.  His steroids can exacerbate perioral dermatitis, and she is already using topical clindamycin antibiotic, recommend we trial pimecrolimus.  If symptoms not improving, next would trial oral antibiotic like doxycycline.  Request update by MyChart in a couple weeks.\"     "

## 2025-07-09 ENCOUNTER — PATIENT OUTREACH (OUTPATIENT)
Dept: CARE COORDINATION | Facility: CLINIC | Age: 40
End: 2025-07-09
Payer: COMMERCIAL

## 2025-07-16 ENCOUNTER — MYC REFILL (OUTPATIENT)
Dept: FAMILY MEDICINE | Facility: CLINIC | Age: 40
End: 2025-07-16
Payer: COMMERCIAL

## 2025-07-16 DIAGNOSIS — F90.2 ATTENTION DEFICIT HYPERACTIVITY DISORDER (ADHD), COMBINED TYPE: ICD-10-CM

## 2025-07-17 RX ORDER — DEXTROAMPHETAMINE SACCHARATE, AMPHETAMINE ASPARTATE MONOHYDRATE, DEXTROAMPHETAMINE SULFATE AND AMPHETAMINE SULFATE 7.5; 7.5; 7.5; 7.5 MG/1; MG/1; MG/1; MG/1
30 CAPSULE, EXTENDED RELEASE ORAL DAILY
Qty: 30 CAPSULE | Refills: 0 | Status: SHIPPED | OUTPATIENT
Start: 2025-07-17

## 2025-08-05 ENCOUNTER — OFFICE VISIT (OUTPATIENT)
Dept: DERMATOLOGY | Facility: CLINIC | Age: 40
End: 2025-08-05
Payer: COMMERCIAL

## 2025-08-05 DIAGNOSIS — L70.0 ACNE VULGARIS: ICD-10-CM

## 2025-08-05 DIAGNOSIS — L70.9 ACNE, UNSPECIFIED ACNE TYPE: ICD-10-CM

## 2025-08-05 DIAGNOSIS — L71.0 PERIORAL DERMATITIS: ICD-10-CM

## 2025-08-05 DIAGNOSIS — L30.1 DYSHIDROTIC ECZEMA: Primary | ICD-10-CM

## 2025-08-05 PROCEDURE — 99213 OFFICE O/P EST LOW 20 MIN: CPT | Performed by: PHYSICIAN ASSISTANT

## 2025-08-05 PROCEDURE — 1126F AMNT PAIN NOTED NONE PRSNT: CPT | Performed by: PHYSICIAN ASSISTANT

## 2025-08-05 RX ORDER — CLOBETASOL PROPIONATE 0.5 MG/G
CREAM TOPICAL
Qty: 30 G | Refills: 4 | Status: SHIPPED | OUTPATIENT
Start: 2025-08-05

## 2025-08-05 RX ORDER — CLINDAMYCIN PHOSPHATE 10 UG/ML
LOTION TOPICAL
Qty: 60 ML | Refills: 4 | Status: SHIPPED | OUTPATIENT
Start: 2025-08-05

## 2025-08-05 ASSESSMENT — PAIN SCALES - GENERAL: PAINLEVEL_OUTOF10: NO PAIN (0)

## 2025-08-16 ENCOUNTER — MYC REFILL (OUTPATIENT)
Dept: FAMILY MEDICINE | Facility: CLINIC | Age: 40
End: 2025-08-16
Payer: COMMERCIAL

## 2025-08-16 DIAGNOSIS — F90.2 ATTENTION DEFICIT HYPERACTIVITY DISORDER (ADHD), COMBINED TYPE: ICD-10-CM

## 2025-08-18 RX ORDER — DEXTROAMPHETAMINE SACCHARATE, AMPHETAMINE ASPARTATE MONOHYDRATE, DEXTROAMPHETAMINE SULFATE AND AMPHETAMINE SULFATE 7.5; 7.5; 7.5; 7.5 MG/1; MG/1; MG/1; MG/1
30 CAPSULE, EXTENDED RELEASE ORAL DAILY
Qty: 30 CAPSULE | Refills: 0 | Status: SHIPPED | OUTPATIENT
Start: 2025-08-18